# Patient Record
Sex: MALE | Race: BLACK OR AFRICAN AMERICAN | Employment: FULL TIME | RURAL
[De-identification: names, ages, dates, MRNs, and addresses within clinical notes are randomized per-mention and may not be internally consistent; named-entity substitution may affect disease eponyms.]

---

## 2017-05-12 ENCOUNTER — OFFICE VISIT (OUTPATIENT)
Dept: FAMILY MEDICINE CLINIC | Age: 47
End: 2017-05-12

## 2017-05-12 VITALS
HEART RATE: 70 BPM | BODY MASS INDEX: 40.94 KG/M2 | TEMPERATURE: 98.3 F | SYSTOLIC BLOOD PRESSURE: 123 MMHG | RESPIRATION RATE: 20 BRPM | OXYGEN SATURATION: 95 % | WEIGHT: 286 LBS | DIASTOLIC BLOOD PRESSURE: 75 MMHG | HEIGHT: 70 IN

## 2017-05-12 DIAGNOSIS — Z12.5 PROSTATE CANCER SCREENING: ICD-10-CM

## 2017-05-12 DIAGNOSIS — K21.9 GASTROESOPHAGEAL REFLUX DISEASE WITHOUT ESOPHAGITIS: ICD-10-CM

## 2017-05-12 DIAGNOSIS — I10 ESSENTIAL HYPERTENSION WITH GOAL BLOOD PRESSURE LESS THAN 140/90: Primary | ICD-10-CM

## 2017-05-12 DIAGNOSIS — N52.9 ERECTILE DYSFUNCTION, UNSPECIFIED ERECTILE DYSFUNCTION TYPE: ICD-10-CM

## 2017-05-12 DIAGNOSIS — Z88.9 H/O SEASONAL ALLERGIES: ICD-10-CM

## 2017-05-12 DIAGNOSIS — E78.2 MIXED HYPERLIPIDEMIA: ICD-10-CM

## 2017-05-12 RX ORDER — OMEPRAZOLE 40 MG/1
40 CAPSULE, DELAYED RELEASE ORAL DAILY
Qty: 90 CAP | Refills: 3 | Status: SHIPPED | OUTPATIENT
Start: 2017-05-12 | End: 2018-05-18 | Stop reason: SDUPTHER

## 2017-05-12 RX ORDER — LISINOPRIL AND HYDROCHLOROTHIAZIDE 20; 25 MG/1; MG/1
1 TABLET ORAL DAILY
Qty: 90 TAB | Refills: 3 | Status: SHIPPED | OUTPATIENT
Start: 2017-05-12 | End: 2018-05-18 | Stop reason: SDUPTHER

## 2017-05-12 RX ORDER — TADALAFIL 5 MG/1
5 TABLET ORAL DAILY
Qty: 90 TAB | Refills: 3 | Status: SHIPPED | OUTPATIENT
Start: 2017-05-12 | End: 2018-05-18 | Stop reason: SDUPTHER

## 2017-05-12 RX ORDER — DESLORATADINE 5 MG/1
5 TABLET ORAL DAILY
Qty: 90 TAB | Refills: 3 | Status: SHIPPED | OUTPATIENT
Start: 2017-05-12 | End: 2018-05-18 | Stop reason: SDUPTHER

## 2017-05-12 RX ORDER — EZETIMIBE AND SIMVASTATIN 10; 20 MG/1; MG/1
1 TABLET ORAL DAILY
Qty: 90 TAB | Refills: 3 | Status: SHIPPED | OUTPATIENT
Start: 2017-05-12 | End: 2018-05-18 | Stop reason: SDUPTHER

## 2017-05-12 NOTE — MR AVS SNAPSHOT
Visit Information Date & Time Provider Department Dept. Phone Encounter #  
 5/12/2017  2:00 PM Gemini Nath MD Alexandro Daniels [id number] Follow-up Instructions Return in about 1 month (around 6/12/2017) for Diet discussion, lab review. Upcoming Health Maintenance Date Due DTaP/Tdap/Td series (2 - Td) 5/20/2025 Allergies as of 5/12/2017  Review Complete On: 5/12/2017 By: Gemini Nath MD  
 No Known Allergies Current Immunizations  Never Reviewed Name Date Influenza Vaccine 12/12/2012 Influenza Vaccine (Quad) PF 9/22/2016 Tdap 5/20/2015 Not reviewed this visit You Were Diagnosed With   
  
 Codes Comments Essential hypertension with goal blood pressure less than 140/90    -  Primary ICD-10-CM: I10 
ICD-9-CM: 401.9 H/O seasonal allergies     ICD-10-CM: Z88.9 ICD-9-CM: V15.09 Erectile dysfunction, unspecified erectile dysfunction type     ICD-10-CM: N52.9 ICD-9-CM: 607.84 Gastroesophageal reflux disease without esophagitis     ICD-10-CM: K21.9 ICD-9-CM: 530.81 Mixed hyperlipidemia     ICD-10-CM: E78.2 ICD-9-CM: 272.2 Prostate cancer screening     ICD-10-CM: Z12.5 ICD-9-CM: V76.44 Vitals BP Pulse Temp Resp Height(growth percentile) Weight(growth percentile) 123/75 (BP 1 Location: Left arm, BP Patient Position: Sitting) 70 98.3 °F (36.8 °C) (Oral) 20 5' 10\" (1.778 m) 286 lb (129.7 kg) SpO2 BMI Smoking Status 95% 41.04 kg/m2 Never Smoker Vitals History BMI and BSA Data Body Mass Index Body Surface Area 41.04 kg/m 2 2.53 m 2 Preferred Pharmacy Pharmacy Name Phone [facility] [phone] Your Updated Medication List  
  
   
This list is accurate as of: 5/12/17  2:25 PM.  Always use your most recent med list.  
  
  
  
  
 clobetasol 0.05 % topical foam  
Commonly known as:  OLUX Apply  to affected area two (2) times a day. use thin film on affected area  
  
 cyclobenzaprine 10 mg tablet Commonly known as:  FLEXERIL Take 1 Tab by mouth three (3) times daily as needed for Muscle Spasm(s). desloratadine 5 mg tablet Commonly known as:  CLARINEX Take 1 Tab by mouth daily. diphenhydrAMINE 25 mg capsule Commonly known as:  BENADRYL Take 1 Cap by mouth every six (6) hours as needed. ezetimibe-simvastatin 10-20 mg per tablet Commonly known as:  Vytorin 10/20 Take 1 Tab by mouth daily. ibuprofen 800 mg tablet Commonly known as:  MOTRIN Take 1 tablet by mouth every eight (8) hours as needed for Pain. lisinopril-hydroCHLOROthiazide 20-25 mg per tablet Commonly known as:  Jerald Mari Take 1 Tab by mouth daily. omeprazole 40 mg capsule Commonly known as:  PRILOSEC Take 1 Cap by mouth daily. tadalafil 5 mg tablet Commonly known as:  CIALIS Take 1 Tab by mouth daily. Prescriptions Sent to Pharmacy Refills  
 desloratadine (CLARINEX) 5 mg tablet 3 Sig: Take 1 Tab by mouth daily. Class: Normal  
 Pharmacy: [address], [address] Ph #: [phone] Route: Oral  
 tadalafil (CIALIS) 5 mg tablet 3 Sig: Take 1 Tab by mouth daily. Class: Normal  
 Pharmacy: [address], [address] Ph #: [phone] Route: Oral  
 lisinopril-hydroCHLOROthiazide (PRINZIDE, ZESTORETIC) 20-25 mg per tablet 3 Sig: Take 1 Tab by mouth daily. Class: Normal  
 Pharmacy: [address], [address] Ph #: [phone] Route: Oral  
 omeprazole (PRILOSEC) 40 mg capsule 3 Sig: Take 1 Cap by mouth daily.   
 Class: Normal  
 Pharmacy: [address], [address] [facility] Ph #: [phone] Route: Oral  
 ezetimibe-simvastatin (VYTORIN 10/20) 10-20 mg per tablet 3 Sig: Take 1 Tab by mouth daily. Class: Normal  
 Pharmacy: [address], [address] [place] [address] Ph #: [phone] Route: Oral  
  
We Performed the Following CBC W/O DIFF [51608 CPT(R)] LIPID PANEL [43207 CPT(R)] METABOLIC PANEL, COMPREHENSIVE [21199 CPT(R)] PSA SCREENING (SCREENING) [ [facility]] Follow-up Instructions Return in about 1 month (around 6/12/2017) for Diet discussion, lab review. Patient Instructions Learning About High Blood Pressure What is high blood pressure? Blood pressure is a measure of how hard the blood pushes against the walls of your arteries. It's normal for blood pressure to go up and down throughout the day, but if it stays up, you have high blood pressure. Another name for high blood pressure is hypertension. Two numbers tell you your blood pressure. The first number is the systolic pressure. It shows how hard the blood pushes when your heart is pumping. The second number is the diastolic pressure. It shows how hard the blood pushes between heartbeats, when your heart is relaxed and filling with blood. A blood pressure of less than 120/80 (say \"120 over 80\") is ideal for an adult. High blood pressure is 140/90 or higher. You have high blood pressure if your top number is 140 or higher or your bottom number is 90 or higher, or both. Many people fall into the category in between, called prehypertension. People with prehypertension need to make lifestyle changes to bring their blood pressure down and help prevent or delay high blood pressure. What happens when you have high blood pressure? · Blood flows through your arteries with too much force. Over time, this damages the walls of your arteries. But you can't feel it. High blood pressure usually doesn't cause symptoms. · Fat and calcium start to build up in your arteries. This buildup is called plaque. Plaque makes your arteries narrower and stiffer. Blood can't flow through them as easily. · This lack of good blood flow starts to damage some of the organs in your body. This can lead to problems such as coronary artery disease and heart attack, heart failure, stroke, kidney failure, and eye damage. How can you prevent high blood pressure? · Stay at a healthy weight. · Try to limit how much sodium you eat to less than 2,300 milligrams (mg) a day. If you limit your sodium to 1,500 mg a day, you can lower your blood pressure even more. ¨ Buy foods that are labeled \"unsalted,\" \"sodium-free,\" or \"low-sodium. \" Foods labeled \"reduced-sodium\" and \"light sodium\" may still have too much sodium. ¨ Flavor your food with garlic, lemon juice, onion, vinegar, herbs, and spices instead of salt. Do not use soy sauce, steak sauce, onion salt, garlic salt, mustard, or ketchup on your food. ¨ Use less salt (or none) when recipes call for it. You can often use half the salt a recipe calls for without losing flavor. · Be physically active. Get at least 30 minutes of exercise on most days of the week. Walking is a good choice. You also may want to do other activities, such as running, swimming, cycling, or playing tennis or team sports. · Limit alcohol to 2 drinks a day for men and 1 drink a day for women. · Eat plenty of fruits, vegetables, and low-fat dairy products. Eat less saturated and total fats. How is high blood pressure treated? · Your doctor will suggest making lifestyle changes. For example, your doctor may ask you to eat healthy foods, quit smoking, lose extra weight, and be more active. · If lifestyle changes don't help enough or your blood pressure is very high, you will have to take medicine every day. Follow-up care is a key part of your treatment and safety.  Be sure to make and go to all appointments, and call your doctor if you are having problems. It's also a good idea to know your test results and keep a list of the medicines you take. Where can you learn more? Go to http://tea-ana.info/. Enter P501 in the search box to learn more about \"Learning About High Blood Pressure. \" Current as of: March 23, 2016 Content Version: 11.2 © 8855-0472 [employer]. Care instructions adapted under license by [facility] (which disclaims liability or warranty for this information). If you have questions about a medical condition or this instruction, always ask your healthcare professional. [address] any warranty or liability for your use of this information. DASH Diet: Care Instructions Your Care Instructions The DASH diet is an eating plan that can help lower your blood pressure. DASH stands for Dietary Approaches to Stop Hypertension. Hypertension is high blood pressure. The DASH diet focuses on eating foods that are high in calcium, potassium, and magnesium. These nutrients can lower blood pressure. The foods that are highest in these nutrients are fruits, vegetables, low-fat dairy products, nuts, seeds, and legumes. But taking calcium, potassium, and magnesium supplements instead of eating foods that are high in those nutrients does not have the same effect. The DASH diet also includes whole grains, fish, and poultry. The DASH diet is one of several lifestyle changes your doctor may recommend to lower your high blood pressure. Your doctor may also want you to decrease the amount of sodium in your diet. Lowering sodium while following the DASH diet can lower blood pressure even further than just the DASH diet alone. Follow-up care is a key part of your treatment and safety.  Be sure to make and go to all appointments, and call your doctor if you are having problems. It's also a good idea to know your test results and keep a list of the medicines you take. How can you care for yourself at home? Following the DASH diet · Eat 4 to 5 servings of fruit each day. A serving is 1 medium-sized piece of fruit, ½ cup chopped or canned fruit, 1/4 cup dried fruit, or 4 ounces (½ cup) of fruit juice. Choose fruit more often than fruit juice. · Eat 4 to 5 servings of vegetables each day. A serving is 1 cup of lettuce or raw leafy vegetables, ½ cup of chopped or cooked vegetables, or 4 ounces (½ cup) of vegetable juice. Choose vegetables more often than vegetable juice. · Get 2 to 3 servings of low-fat and fat-free dairy each day. A serving is 8 ounces of milk, 1 cup of yogurt, or 1 ½ ounces of cheese. · Eat 6 to 8 servings of grains each day. A serving is 1 slice of bread, 1 ounce of dry cereal, or ½ cup of cooked rice, pasta, or cooked cereal. Try to choose whole-grain products as much as possible. · Limit lean meat, poultry, and fish to 2 servings each day. A serving is 3 ounces, about the size of a deck of cards. · Eat 4 to 5 servings of nuts, seeds, and legumes (cooked dried beans, lentils, and split peas) each week. A serving is 1/3 cup of nuts, 2 tablespoons of seeds, or ½ cup of cooked beans or peas. · Limit fats and oils to 2 to 3 servings each day. A serving is 1 teaspoon of vegetable oil or 2 tablespoons of salad dressing. · Limit sweets and added sugars to 5 servings or less a week. A serving is 1 tablespoon jelly or jam, ½ cup sorbet, or 1 cup of lemonade. · Eat less than 2,300 milligrams (mg) of sodium a day. If you limit your sodium to 1,500 mg a day, you can lower your blood pressure even more. Tips for success · Start small. Do not try to make dramatic changes to your diet all at once. You might feel that you are missing out on your favorite foods and then be more likely to not follow the plan.  Make small changes, and stick with them. Once those changes become habit, add a few more changes. · Try some of the following: ¨ Make it a goal to eat a fruit or vegetable at every meal and at snacks. This will make it easy to get the recommended amount of fruits and vegetables each day. ¨ Try yogurt topped with fruit and nuts for a snack or healthy dessert. ¨ Add lettuce, tomato, cucumber, and onion to sandwiches. ¨ Combine a ready-made pizza crust with low-fat mozzarella cheese and lots of vegetable toppings. Try using tomatoes, squash, spinach, broccoli, carrots, cauliflower, and onions. ¨ Have a variety of cut-up vegetables with a low-fat dip as an appetizer instead of chips and dip. ¨ Sprinkle sunflower seeds or chopped almonds over salads. Or try adding chopped walnuts or almonds to cooked vegetables. ¨ Try some vegetarian meals using beans and peas. Add garbanzo or kidney beans to salads. Make burritos and tacos with mashed kumar beans or black beans. Where can you learn more? Go to http://tea-ana.info/. Enter A910 in the search box to learn more about \"DASH Diet: Care Instructions. \" Current as of: March 23, 2016 Content Version: 11.2 © 6960-2869 [employer]card. Care instructions adapted under license by [employer] (which disclaims liability or warranty for this information). If you have questions about a medical condition or this instruction, always ask your healthcare professional. [place] [address] any warranty or liability for your use of this information. Introducing [facility] & HEALTH SERVICES! [facility] introduces [employer] patient portal. Now you can access parts of your medical record, email your doctor's office, and request medication refills online. 1. In your internet browser, go to [email or web address] 2. Click on the First Time User? Click Here link in the Sign In box. You will see the New Member Sign Up page. 3. Enter your [facility] Access Code exactly as it appears below. You will not need to use this code after youve completed the sign-up process. If you do not sign up before the expiration date, you must request a new code. · [employer] Access Code: [email or web address] Expires: 8/10/2017  2:25 PM 
 
4. Enter the last four digits of your Social Security Number (xxxx) and Date of Birth (mm/dd/yyyy) as indicated and click Submit. You will be taken to the next sign-up page. 5. Create a [facility]med ID. This will be your [facility] login ID and cannot be changed, so think of one that is secure and easy to remember. 6. Create a [employer] password. You can change your password at any time. 7. Enter your Password Reset Question and Answer. This can be used at a later time if you forget your password. 8. Enter your e-mail address. You will receive e-mail notification when new information is available in [address]. 9. Click Sign Up. You can now view and download portions of your medical record. 10. Click the Download Summary menu link to download a portable copy of your medical information. If you have questions, please visit the Frequently Asked Questions section of the [employer] website. Remember, [facility] is NOT to be used for urgent needs. For medical emergencies, dial 911. Now available from your iPhone and Android! Please provide this summary of care documentation to your next provider. Your primary care clinician is listed as [address]. If you have any questions after today's visit, please call [phone].

## 2017-05-12 NOTE — PROGRESS NOTES
[facility]    Subjective:   Selam Baldwin is a 49 y.o. male with history of HTN, HLD, ED, and environmental Allergies  CC: Follow up, [place] paperwork  History provided by patient and Records    HPI:    Hypertension: The patient reports:  taking medications as instructed, no medication side effects noted, home BP monitoring in range of 054-291'D systolic over 83-21'G diastolic, no TIA's, no chest pain on exertion, no dyspnea on exertion, no swelling of ankles, no orthostatic dizziness or lightheadedness, no orthopnea or paroxysmal nocturnal dyspnea, no palpitations, erectile dysfunction is present, but improved currently with medication. Hypertriglyceridemia: Cardiovascular risks for him are: hypertension, hyperlipidemia, obese. Currently he takes Vytorin (ezetimibe-simvastatin) , 10-20 mg. Denies myalgia, fatigue, or other side effects with medication. GERD: Current control of Symptoms is good with omeprazole. The patient uses Motrin occasionally for muscle aches and pains. The patient has no history melena or bright red blood in the stools. No history of hiatal hernia. Erectile Dysfunction: Currently takes Cialis and takes 1 tablet every other day. With current regimen states he is able to maintain erection and perform without significant issue. Denies painful or prolonged erections. Current Outpatient Prescriptions on File Prior to Visit   Medication Sig Dispense Refill    lisinopril-hydrochlorothiazide (PRINZIDE, ZESTORETIC) 20-25 mg per tablet Take 1 Tab by mouth daily. 90 Tab 3    omeprazole (PRILOSEC) 40 mg capsule Take 1 Cap by mouth daily. 90 Cap 3    ezetimibe-simvastatin (VYTORIN 10/20) 10-20 mg per tablet Take 1 Tab by mouth daily. 90 Tab 3    desloratadine (CLARINEX) 5 mg tablet Take 1 Tab by mouth daily. 90 Tab 3    tadalafil (CIALIS) 5 mg tablet Take 1 Tab by mouth daily.  90 Tab 3    cyclobenzaprine (FLEXERIL) 10 mg tablet Take 1 Tab by mouth three (3) times daily as needed for Muscle Spasm(s). 60 Tab 2    ibuprofen (MOTRIN) 800 mg tablet Take 1 tablet by mouth every eight (8) hours as needed for Pain. 90 tablet 3    diphenhydrAMINE (BENADRYL) 25 mg capsule Take 1 Cap by mouth every six (6) hours as needed. 30 Cap 2    clobetasol (OLUX) 0.05 % topical foam Apply  to affected area two (2) times a day. use thin film on affected area 1 Can 0     No current facility-administered medications on file prior to visit. Patient Active Problem List   Diagnosis Code    Herniated disc [id number]    Environmental allergies Z91.09    HTN (hypertension) I10    Hyperlipidemia E78.5    ED (erectile dysfunction) of organic origin N52.9    GERD (gastroesophageal reflux disease) K21.9       Social History     Social History    Marital status:      Spouse name: N/A    Number of children: N/A    Years of education: N/A     Occupational History    Not on file. Social History Main Topics    Smoking status: Never Smoker    Smokeless tobacco: Never Used    Alcohol use Yes      Comment: social    Drug use: No    Sexual activity: Not on file     Other Topics Concern    Not on file     Social History Narrative       Review of Systems   HENT: Positive for congestion. Eyes: Negative for blurred vision and photophobia. Respiratory: Negative for cough and shortness of breath. Cardiovascular: Negative for chest pain and palpitations. Gastrointestinal: Negative for heartburn, nausea and vomiting. Neurological: Negative for dizziness and headaches.          Objective:     Visit Vitals    /75 (BP 1 Location: Left arm, BP Patient Position: Sitting)    Pulse 70    Temp 98.3 °F (36.8 °C) (Oral)    Resp 20    Ht 5' 10\" (1.778 m)    Wt 286 lb (129.7 kg)    SpO2 95%    BMI 41.04 kg/m2      BP Readings from Last 3 Encounters:   05/12/17 123/75   09/22/16 120/73   04/21/16 117/85     Wt Readings from Last 3 Encounters:   05/12/17 286 lb (129.7 kg)   09/22/16 283 lb (128.4 kg)   04/21/16 281 lb 3.2 oz (127.6 kg)         Physical Exam   Constitutional: He appears well-developed and well-nourished. No distress. HENT:   Head: Normocephalic and atraumatic. Eyes: Pupils are equal, round, and reactive to light. Fundoscopic exam:       The right eye shows no hemorrhage and no papilledema. The left eye shows no hemorrhage and no papilledema. Cardiovascular: Normal rate, regular rhythm, normal heart sounds and intact distal pulses. Pulmonary/Chest: Effort normal and breath sounds normal.   Abdominal: Soft. Bowel sounds are normal. There is no tenderness. Musculoskeletal: He exhibits no edema. Nursing note and vitals reviewed. Pertinent Labs/Studies:    Lab Results   Component Value Date/Time    Sodium 133 04/21/2016 08:56 AM    Potassium 4.4 04/21/2016 08:56 AM    Chloride 93 04/21/2016 08:56 AM    CO2 22 04/21/2016 08:56 AM    Anion gap 8 09/22/2010 09:09 AM    Glucose 104 04/21/2016 08:56 AM    BUN 13 04/21/2016 08:56 AM    Creatinine 0.92 04/21/2016 08:56 AM    BUN/Creatinine ratio 14 04/21/2016 08:56 AM    GFR est  04/21/2016 08:56 AM    GFR est non-AA 99 04/21/2016 08:56 AM    Calcium 10.6 04/21/2016 08:56 AM    Bilirubin, total 0.9 04/21/2016 08:56 AM    AST (SGOT) 22 04/21/2016 08:56 AM    Alk. phosphatase 42 04/21/2016 08:56 AM    Protein, total 7.6 04/21/2016 08:56 AM    Albumin 4.5 04/21/2016 08:56 AM    Globulin 3.9 09/22/2010 09:09 AM    A-G Ratio 1.5 04/21/2016 08:56 AM    ALT (SGPT) 23 04/21/2016 08:56 AM       Lab Results   Component Value Date/Time    Cholesterol, total 133 04/21/2016 08:56 AM    HDL Cholesterol 35 04/21/2016 08:56 AM    LDL, calculated 63 04/21/2016 08:56 AM    Triglyceride 177 04/21/2016 08:56 AM    CHOL/HDL Ratio 5.0 09/22/2010 09:09 AM     Lab Results   Component Value Date/Time    ALT (SGPT) 23 04/21/2016 08:56 AM    AST (SGOT) 22 04/21/2016 08:56 AM    Alk.  phosphatase 42 04/21/2016 08:56 AM    Bilirubin, total 0.9 04/21/2016 08:56 AM         Assessment and orders:       ICD-10-CM ICD-9-CM    1. Essential hypertension with goal blood pressure less than 140/90 I10 401.9 lisinopril-hydroCHLOROthiazide (PRINZIDE, ZESTORETIC) 20-25 mg per tablet      CBC W/O DIFF      METABOLIC PANEL, COMPREHENSIVE   2. H/O seasonal allergies Z88.9 V15.09 desloratadine (CLARINEX) 5 mg tablet   3. Erectile dysfunction, unspecified erectile dysfunction type N52.9 607.84 tadalafil (CIALIS) 5 mg tablet   4. Gastroesophageal reflux disease without esophagitis K21.9 530.81 omeprazole (PRILOSEC) 40 mg capsule   5. Mixed hyperlipidemia E78.2 272.2 ezetimibe-simvastatin (VYTORIN 10/20) 10-20 mg per tablet      LIPID PANEL   6. Prostate cancer screening Z12.5 V76.44 PSA SCREENING (SCREENING)     Little Prom was seen today for hypertension and cholesterol problem. Diagnoses and all orders for this visit:    Essential hypertension with goal blood pressure less than 140/90: Appears very well controlled in office and at home. Discussed goal blood pressures with goal to prevent stroke and heart disease. Filled out [facility] paperwork for follow ups for HTN management. On follow up visit will discuss body weight.  -     lisinopril-hydroCHLOROthiazide (PRINZIDE, ZESTORETIC) 20-25 mg per tablet; Take 1 Tab by mouth daily.  -     CBC W/O DIFF  -     METABOLIC PANEL, COMPREHENSIVE    H/O seasonal allergies: Mildly exacerbated, refill medication. -     desloratadine (CLARINEX) 5 mg tablet; Take 1 Tab by mouth daily. Erectile dysfunction, unspecified erectile dysfunction type: Stable at this time, recommend continued use as described. Discussed alternative methods of use, but patient is satisfied with current regimen. Discussed use of alternative BP medication (Diovan) and decreased effect on erection, on review will advise continued current regimen as unlikely to have significant improvemen with change in medication.   -     tadalafil (CIALIS) 5 mg tablet; Take 1 Tab by mouth daily. Gastroesophageal reflux disease without esophagitis: Stable, refill. Discussed risks and benefits of use of PPI's and slow reduction of use as tolerated. -     omeprazole (PRILOSEC) 40 mg capsule; Take 1 Cap by mouth daily. Mixed hyperlipidemia: The patient is aware of our goal to reduce or eliminate the long term problems (such as strokes and heart attacks) related to poorly controlled Triglycerides. -     ezetimibe-simvastatin (VYTORIN 10/20) 10-20 mg per tablet; Take 1 Tab by mouth daily.  -     LIPID PANEL    Prostate cancer screening: Family history of Prostate cancer, Last PSA 1.3 in 2015. If normal repeat in 1-2 years. -     PSA - SCREENING ()      Follow-up Disposition:  Return in about 1 month (around 6/12/2017) for Diet discussion, lab review. I have reviewed patient medical and social history and medications. I have reviewed pertinent labs results and other data. I have discussed the diagnosis with the patient and the intended plan as seen in the above orders. The patient has received an after-visit summary and questions were answered concerning future plans. I have discussed medication side effects and warnings with the patient as well.     Constantine Vasquez MD  Resident [facility] ALEXANDRA [facility]  05/12/17    Patient discussed with Dr. Desire Ervin, Attending Physician

## 2017-05-12 NOTE — PATIENT INSTRUCTIONS
Learning About High Blood Pressure  What is high blood pressure? Blood pressure is a measure of how hard the blood pushes against the walls of your arteries. It's normal for blood pressure to go up and down throughout the day, but if it stays up, you have high blood pressure. Another name for high blood pressure is hypertension. Two numbers tell you your blood pressure. The first number is the systolic pressure. It shows how hard the blood pushes when your heart is pumping. The second number is the diastolic pressure. It shows how hard the blood pushes between heartbeats, when your heart is relaxed and filling with blood. A blood pressure of less than 120/80 (say \"120 over 80\") is ideal for an adult. High blood pressure is 140/90 or higher. You have high blood pressure if your top number is 140 or higher or your bottom number is 90 or higher, or both. Many people fall into the category in between, called prehypertension. People with prehypertension need to make lifestyle changes to bring their blood pressure down and help prevent or delay high blood pressure. What happens when you have high blood pressure? · Blood flows through your arteries with too much force. Over time, this damages the walls of your arteries. But you can't feel it. High blood pressure usually doesn't cause symptoms. · Fat and calcium start to build up in your arteries. This buildup is called plaque. Plaque makes your arteries narrower and stiffer. Blood can't flow through them as easily. · This lack of good blood flow starts to damage some of the organs in your body. This can lead to problems such as coronary artery disease and heart attack, heart failure, stroke, kidney failure, and eye damage. How can you prevent high blood pressure? · Stay at a healthy weight. · Try to limit how much sodium you eat to less than 2,300 milligrams (mg) a day.  If you limit your sodium to 1,500 mg a day, you can lower your blood pressure even more.  ¨ Buy foods that are labeled \"unsalted,\" \"sodium-free,\" or \"low-sodium. \" Foods labeled \"reduced-sodium\" and \"light sodium\" may still have too much sodium. ¨ Flavor your food with garlic, lemon juice, onion, vinegar, herbs, and spices instead of salt. Do not use soy sauce, steak sauce, onion salt, garlic salt, mustard, or ketchup on your food. ¨ Use less salt (or none) when recipes call for it. You can often use half the salt a recipe calls for without losing flavor. · Be physically active. Get at least 30 minutes of exercise on most days of the week. Walking is a good choice. You also may want to do other activities, such as running, swimming, cycling, or playing tennis or team sports. · Limit alcohol to 2 drinks a day for men and 1 drink a day for women. · Eat plenty of fruits, vegetables, and low-fat dairy products. Eat less saturated and total fats. How is high blood pressure treated? · Your doctor will suggest making lifestyle changes. For example, your doctor may ask you to eat healthy foods, quit smoking, lose extra weight, and be more active. · If lifestyle changes don't help enough or your blood pressure is very high, you will have to take medicine every day. Follow-up care is a key part of your treatment and safety. Be sure to make and go to all appointments, and call your doctor if you are having problems. It's also a good idea to know your test results and keep a list of the medicines you take. Where can you learn more? Go to http://tea-ana.info/. Enter P501 in the search box to learn more about \"Learning About High Blood Pressure. \"  Current as of: March 23, 2016  Content Version: 11.2  © 0624-2176 [employer]. Care instructions adapted under license by [employer] (which disclaims liability or warranty for this information).  If you have questions about a medical condition or this instruction, always ask your healthcare professional. [employer], [facility] disclaims any warranty or liability for your use of this information. DASH Diet: Care Instructions  Your Care Instructions  The DASH diet is an eating plan that can help lower your blood pressure. DASH stands for Dietary Approaches to Stop Hypertension. Hypertension is high blood pressure. The DASH diet focuses on eating foods that are high in calcium, potassium, and magnesium. These nutrients can lower blood pressure. The foods that are highest in these nutrients are fruits, vegetables, low-fat dairy products, nuts, seeds, and legumes. But taking calcium, potassium, and magnesium supplements instead of eating foods that are high in those nutrients does not have the same effect. The DASH diet also includes whole grains, fish, and poultry. The DASH diet is one of several lifestyle changes your doctor may recommend to lower your high blood pressure. Your doctor may also want you to decrease the amount of sodium in your diet. Lowering sodium while following the DASH diet can lower blood pressure even further than just the DASH diet alone. Follow-up care is a key part of your treatment and safety. Be sure to make and go to all appointments, and call your doctor if you are having problems. It's also a good idea to know your test results and keep a list of the medicines you take. How can you care for yourself at home? Following the DASH diet  · Eat 4 to 5 servings of fruit each day. A serving is 1 medium-sized piece of fruit, ½ cup chopped or canned fruit, 1/4 cup dried fruit, or 4 ounces (½ cup) of fruit juice. Choose fruit more often than fruit juice. · Eat 4 to 5 servings of vegetables each day. A serving is 1 cup of lettuce or raw leafy vegetables, ½ cup of chopped or cooked vegetables, or 4 ounces (½ cup) of vegetable juice. Choose vegetables more often than vegetable juice. · Get 2 to 3 servings of low-fat and fat-free dairy each day.  A serving is 8 ounces of milk, 1 cup of yogurt, or 1 ½ ounces of cheese. · Eat 6 to 8 servings of grains each day. A serving is 1 slice of bread, 1 ounce of dry cereal, or ½ cup of cooked rice, pasta, or cooked cereal. Try to choose whole-grain products as much as possible. · Limit lean meat, poultry, and fish to 2 servings each day. A serving is 3 ounces, about the size of a deck of cards. · Eat 4 to 5 servings of nuts, seeds, and legumes (cooked dried beans, lentils, and split peas) each week. A serving is 1/3 cup of nuts, 2 tablespoons of seeds, or ½ cup of cooked beans or peas. · Limit fats and oils to 2 to 3 servings each day. A serving is 1 teaspoon of vegetable oil or 2 tablespoons of salad dressing. · Limit sweets and added sugars to 5 servings or less a week. A serving is 1 tablespoon jelly or jam, ½ cup sorbet, or 1 cup of lemonade. · Eat less than 2,300 milligrams (mg) of sodium a day. If you limit your sodium to 1,500 mg a day, you can lower your blood pressure even more. Tips for success  · Start small. Do not try to make dramatic changes to your diet all at once. You might feel that you are missing out on your favorite foods and then be more likely to not follow the plan. Make small changes, and stick with them. Once those changes become habit, add a few more changes. · Try some of the following:  ¨ Make it a goal to eat a fruit or vegetable at every meal and at snacks. This will make it easy to get the recommended amount of fruits and vegetables each day. ¨ Try yogurt topped with fruit and nuts for a snack or healthy dessert. ¨ Add lettuce, tomato, cucumber, and onion to sandwiches. ¨ Combine a ready-made pizza crust with low-fat mozzarella cheese and lots of vegetable toppings. Try using tomatoes, squash, spinach, broccoli, carrots, cauliflower, and onions. ¨ Have a variety of cut-up vegetables with a low-fat dip as an appetizer instead of chips and dip. ¨ Sprinkle sunflower seeds or chopped almonds over salads.  Or try adding chopped walnuts or almonds to cooked vegetables. ¨ Try some vegetarian meals using beans and peas. Add garbanzo or kidney beans to salads. Make burritos and tacos with mashed kumar beans or black beans. Where can you learn more? Go to http://tea-ana.info/. Enter Z939 in the search box to learn more about \"DASH Diet: Care Instructions. \"  Current as of: March 23, 2016  Content Version: 11.2  © 8825-7819 [employer]. Care instructions adapted under license by [employer] (which disclaims liability or warranty for this information). If you have questions about a medical condition or this instruction, always ask your healthcare professional. [address] any warranty or liability for your use of this information.

## 2017-05-14 NOTE — PROGRESS NOTES
I discussed the findings, assessment and plan in detail with the resident and agree with the resident's findings and plan as documented in the resident's note.     Yong Chappell MD

## 2017-05-20 LAB
ALBUMIN SERPL-MCNC: 4.3 G/DL (ref 3.5–5.5)
ALBUMIN/GLOB SERPL: 1.5 {RATIO} (ref 1.2–2.2)
ALP SERPL-CCNC: 39 IU/L (ref 39–117)
ALT SERPL-CCNC: 23 IU/L (ref 0–44)
AST SERPL-CCNC: 20 IU/L (ref 0–40)
BILIRUB SERPL-MCNC: 0.7 MG/DL (ref 0–1.2)
BUN SERPL-MCNC: 14 MG/DL (ref 6–24)
BUN/CREAT SERPL: 13 (ref 9–20)
CALCIUM SERPL-MCNC: 10.1 MG/DL (ref 8.7–10.2)
CHLORIDE SERPL-SCNC: 94 MMOL/L (ref 96–106)
CHOLEST SERPL-MCNC: 111 MG/DL (ref 100–199)
CO2 SERPL-SCNC: 24 MMOL/L (ref 18–29)
CREAT SERPL-MCNC: 1.09 MG/DL (ref 0.76–1.27)
ERYTHROCYTE [DISTWIDTH] IN BLOOD BY AUTOMATED COUNT: 13.8 % (ref 12.3–15.4)
GLOBULIN SER CALC-MCNC: 2.8 G/DL (ref 1.5–4.5)
GLUCOSE SERPL-MCNC: 107 MG/DL (ref 65–99)
HCT VFR BLD AUTO: 41.7 % (ref 37.5–51)
HDLC SERPL-MCNC: 31 MG/DL
HGB BLD-MCNC: 13.9 G/DL (ref 12.6–17.7)
LDLC SERPL CALC-MCNC: 60 MG/DL (ref 0–99)
MCH RBC QN AUTO: 29 PG (ref 26.6–33)
MCHC RBC AUTO-ENTMCNC: 33.3 G/DL (ref 31.5–35.7)
MCV RBC AUTO: 87 FL (ref 79–97)
PLATELET # BLD AUTO: 268 X10E3/UL (ref 150–379)
POTASSIUM SERPL-SCNC: 4.5 MMOL/L (ref 3.5–5.2)
PROT SERPL-MCNC: 7.1 G/DL (ref 6–8.5)
PSA SERPL-MCNC: 1.2 NG/ML (ref 0–4)
RBC # BLD AUTO: 4.8 X10E6/UL (ref 4.14–5.8)
SODIUM SERPL-SCNC: 134 MMOL/L (ref 134–144)
TRIGL SERPL-MCNC: 98 MG/DL (ref 0–149)
VLDLC SERPL CALC-MCNC: 20 MG/DL (ref 5–40)
WBC # BLD AUTO: 4.9 X10E3/UL (ref 3.4–10.8)

## 2017-05-30 NOTE — PROGRESS NOTES
Stable and well controlled labs overall. HDL is on the low side, but otherwise Lipids are looking good. PSA stable and wnl.

## 2017-07-01 NOTE — ACP (ADVANCE CARE PLANNING)
Information was given to pt on Advanced Directives, Living Will  opportunity was given for questions "None."

## 2018-05-18 ENCOUNTER — OFFICE VISIT (OUTPATIENT)
Dept: FAMILY MEDICINE CLINIC | Age: 48
End: 2018-05-18

## 2018-05-18 VITALS
HEIGHT: 70 IN | SYSTOLIC BLOOD PRESSURE: 118 MMHG | OXYGEN SATURATION: 97 % | BODY MASS INDEX: 40.86 KG/M2 | RESPIRATION RATE: 20 BRPM | TEMPERATURE: 100 F | DIASTOLIC BLOOD PRESSURE: 77 MMHG | WEIGHT: 285.4 LBS | HEART RATE: 68 BPM

## 2018-05-18 DIAGNOSIS — Z87.39 HISTORY OF HERNIATED INTERVERTEBRAL DISC: ICD-10-CM

## 2018-05-18 DIAGNOSIS — E78.2 MIXED HYPERLIPIDEMIA: ICD-10-CM

## 2018-05-18 DIAGNOSIS — R39.198 URINARY STREAM SLOWING: ICD-10-CM

## 2018-05-18 DIAGNOSIS — K21.9 GASTROESOPHAGEAL REFLUX DISEASE WITHOUT ESOPHAGITIS: ICD-10-CM

## 2018-05-18 DIAGNOSIS — I10 ESSENTIAL HYPERTENSION WITH GOAL BLOOD PRESSURE LESS THAN 140/90: Primary | ICD-10-CM

## 2018-05-18 DIAGNOSIS — N52.9 ERECTILE DYSFUNCTION, UNSPECIFIED ERECTILE DYSFUNCTION TYPE: ICD-10-CM

## 2018-05-18 DIAGNOSIS — H10.13 ALLERGIC CONJUNCTIVITIS OF BOTH EYES: ICD-10-CM

## 2018-05-18 DIAGNOSIS — Z88.9 H/O SEASONAL ALLERGIES: ICD-10-CM

## 2018-05-18 DIAGNOSIS — Z80.42 FAMILY HISTORY OF PROSTATE CANCER: ICD-10-CM

## 2018-05-18 RX ORDER — TADALAFIL 5 MG/1
5 TABLET ORAL DAILY
Qty: 90 TAB | Refills: 3 | Status: SHIPPED | OUTPATIENT
Start: 2018-05-18 | End: 2018-12-07 | Stop reason: SDUPTHER

## 2018-05-18 RX ORDER — DESLORATADINE 5 MG/1
5 TABLET ORAL DAILY
Qty: 90 TAB | Refills: 3 | Status: SHIPPED | OUTPATIENT
Start: 2018-05-18 | End: 2018-12-07 | Stop reason: SDUPTHER

## 2018-05-18 RX ORDER — OMEPRAZOLE 40 MG/1
40 CAPSULE, DELAYED RELEASE ORAL DAILY
Qty: 90 CAP | Refills: 3 | Status: SHIPPED | OUTPATIENT
Start: 2018-05-18 | End: 2018-12-07 | Stop reason: SDUPTHER

## 2018-05-18 RX ORDER — IBUPROFEN 800 MG/1
800 TABLET ORAL
Qty: 180 TAB | Refills: 3 | Status: SHIPPED | OUTPATIENT
Start: 2018-05-18 | End: 2018-12-07 | Stop reason: SDUPTHER

## 2018-05-18 RX ORDER — LISINOPRIL AND HYDROCHLOROTHIAZIDE 20; 25 MG/1; MG/1
1 TABLET ORAL DAILY
Qty: 90 TAB | Refills: 3 | Status: SHIPPED | OUTPATIENT
Start: 2018-05-18 | End: 2018-12-07 | Stop reason: SDUPTHER

## 2018-05-18 RX ORDER — EZETIMIBE AND SIMVASTATIN 10; 20 MG/1; MG/1
1 TABLET ORAL DAILY
Qty: 90 TAB | Refills: 3 | Status: SHIPPED | OUTPATIENT
Start: 2018-05-18 | End: 2018-12-07 | Stop reason: SDUPTHER

## 2018-05-18 NOTE — PROGRESS NOTES
[facility]    Subjective:   Padmini Alba is a 51 y.o. male with history of HTN, HLD, GERD, ED, Herniated Disc  CC: Follow up HTN  History provided by patient and records    HPI:  Hypertension Follow up:  Currently Taking Prinzide 20-25 mg. The patient reports:  taking medications as instructed, no medication side effects noted, no TIA's, no chest pain on exertion, no dyspnea on exertion, no swelling of ankles, no orthostatic dizziness or lightheadedness, no orthopnea or paroxysmal nocturnal dyspnea. Does note some urinary urgency with HCTZ. BP Readings from Last 3 Encounters:   05/18/18 118/77   05/12/17 123/75   09/22/16 120/73     Hypertriglyceridemia Follow up:   Cardiovascular risks for him are: LDL goal is under 100, hypertension, hyperlipidemia, obese. Currently he takes Vytorin ,10-20 mg   Myalgias: No   Fatigue: No   Other side effects: no     Wt Readings from Last 3 Encounters:   05/18/18 285 lb 6.4 oz (129.5 kg)   05/12/17 286 lb (129.7 kg)   09/22/16 283 lb (128.4 kg)       Decreased Stream: Noting over the last year has noted some decreased urinary stream at end of urination. No issue with initiating stream and no blood in urine. GERD: Well controlled on Omeprazole, not exacerbated on NSAIDs    Herniated Disc: Pain well controlled with Motrin daily    Environmental Allergies: Clarinex controls symptoms well    PFSH: Children in High school. Current Outpatient Prescriptions on File Prior to Visit   Medication Sig Dispense Refill    cyclobenzaprine (FLEXERIL) 10 mg tablet Take 1 Tab by mouth three (3) times daily as needed for Muscle Spasm(s). 60 Tab 2    diphenhydrAMINE (BENADRYL) 25 mg capsule Take 1 Cap by mouth every six (6) hours as needed. 30 Cap 2    clobetasol (OLUX) 0.05 % topical foam Apply  to affected area two (2) times a day. use thin film on affected area 1 Can 0     No current facility-administered medications on file prior to visit. Patient Active Problem List   Diagnosis Code    History of herniated intervertebral disc Z87.39    Environmental allergies Z91.09    HTN (hypertension) I10    Hyperlipidemia E78.5    ED (erectile dysfunction) of organic origin N52.9    GERD (gastroesophageal reflux disease) K21.9       Social History     Social History    Marital status:      Spouse name: N/A    Number of children: N/A    Years of education: N/A     Occupational History    Not on file. Social History Main Topics    Smoking status: Never Smoker    Smokeless tobacco: Never Used    Alcohol use Yes      Comment: social    Drug use: No    Sexual activity: Not on file     Other Topics Concern    Not on file     Social History Narrative       Review of Systems   Constitutional: Negative for malaise/fatigue. HENT: Positive for congestion. Eyes: Positive for redness. Respiratory: Negative for shortness of breath. Cardiovascular: Negative for chest pain and leg swelling. Gastrointestinal: Negative for abdominal pain, nausea and vomiting. Neurological: Negative for headaches. Objective:     Visit Vitals    /77    Pulse 68    Temp 100 °F (37.8 °C) (Oral)    Resp 20    Ht 5' 10\" (1.778 m)    Wt 285 lb 6.4 oz (129.5 kg)    SpO2 97%    BMI 40.95 kg/m2          Physical Exam   Constitutional: He appears well-developed and well-nourished. No distress. HENT:   Head: Normocephalic and atraumatic. Eyes: EOM are normal. Pupils are equal, round, and reactive to light. Right conjunctiva is injected. Left conjunctiva is injected. Cardiovascular: Normal rate, regular rhythm, normal heart sounds and intact distal pulses. Exam reveals no gallop and no friction rub. No murmur heard. Pulmonary/Chest: Effort normal and breath sounds normal.   Abdominal: Soft. Bowel sounds are normal. He exhibits no distension. There is no tenderness. Genitourinary: Prostate normal. Prostate is not tender. Musculoskeletal: He exhibits no edema. Nursing note and vitals reviewed. Pertinent Labs/Studies:  Lab Results   Component Value Date/Time    Sodium 134 05/19/2017 08:50 AM    Potassium 4.5 05/19/2017 08:50 AM    Chloride 94 (L) 05/19/2017 08:50 AM    CO2 24 05/19/2017 08:50 AM    Anion gap 8 09/22/2010 09:09 AM    Glucose 107 (H) 05/19/2017 08:50 AM    BUN 14 05/19/2017 08:50 AM    Creatinine 1.09 05/19/2017 08:50 AM    BUN/Creatinine ratio 13 05/19/2017 08:50 AM    GFR est AA 92 05/19/2017 08:50 AM    GFR est non-AA 80 05/19/2017 08:50 AM    Calcium 10.1 05/19/2017 08:50 AM    Bilirubin, total 0.7 05/19/2017 08:50 AM    AST (SGOT) 20 05/19/2017 08:50 AM    Alk. phosphatase 39 05/19/2017 08:50 AM    Protein, total 7.1 05/19/2017 08:50 AM    Albumin 4.3 05/19/2017 08:50 AM    Globulin 3.9 09/22/2010 09:09 AM    A-G Ratio 1.5 05/19/2017 08:50 AM    ALT (SGPT) 23 05/19/2017 08:50 AM       No results found for: HBA1C, HGBE8, NOI6PLMK    Lab Results   Component Value Date/Time    Cholesterol, total 111 05/19/2017 08:50 AM    HDL Cholesterol 31 (L) 05/19/2017 08:50 AM    LDL, calculated 60 05/19/2017 08:50 AM    Triglyceride 98 05/19/2017 08:50 AM    CHOL/HDL Ratio 5.0 09/22/2010 09:09 AM       Lab Results   Component Value Date/Time    WBC 4.9 05/19/2017 08:50 AM    HGB 13.9 05/19/2017 08:50 AM    HCT 41.7 05/19/2017 08:50 AM    PLATELET 408 50/92/3511 08:50 AM    MCV 87 05/19/2017 08:50 AM     Lab Results   Component Value Date/Time    Prostate Specific Ag 1.2 05/19/2017 08:50 AM    Prostate Specific Ag 1.3 10/06/2015 09:55 AM    Prostate Specific Ag 1.2 07/24/2013 09:38 AM    Prostate Specific Ag 1.1 09/22/2010 09:09 AM    Prostate Specific Ag 0.9 02/25/2009 10:41 AM       Assessment and orders:       ICD-10-CM ICD-9-CM    1.  Essential hypertension with goal blood pressure less than 140/90 I10 401.9 lisinopril-hydroCHLOROthiazide (PRINZIDE, ZESTORETIC) 20-25 mg per tablet      CBC W/O DIFF      METABOLIC PANEL, COMPREHENSIVE   2. Gastroesophageal reflux disease without esophagitis K21.9 530.81 omeprazole (PRILOSEC) 40 mg capsule   3. Erectile dysfunction, unspecified erectile dysfunction type N52.9 607.84 tadalafil (CIALIS) 5 mg tablet   4. Mixed hyperlipidemia E78.2 272.2 ezetimibe-simvastatin (VYTORIN 10/20) 10-20 mg per tablet      LIPID PANEL   5. H/O seasonal allergies Z88.9 V15.09 desloratadine (CLARINEX) 5 mg tablet   6. Urinary stream slowing R39.198 788.62 PSA W/ REFLX FREE PSA   7. Family history of prostate cancer Z80.42 V16.42 PSA W/ REFLX FREE PSA   8. Allergic conjunctivitis of both eyes H10.13 372.14    9. History of herniated intervertebral disc Z87.39 V13.59 ibuprofen (MOTRIN) 800 mg tablet     Diagnoses and all orders for this visit:    1. Essential hypertension with goal blood pressure less than 140/90: Very well controlled at this time. COntinue current regimen. Basic labs today  -     lisinopril-hydroCHLOROthiazide (PRINZIDE, ZESTORETIC) 20-25 mg per tablet; Take 1 Tab by mouth daily.  -     CBC W/O DIFF  -     METABOLIC PANEL, COMPREHENSIVE    2. Gastroesophageal reflux disease without esophagitis: Well controlled  -     omeprazole (PRILOSEC) 40 mg capsule; Take 1 Cap by mouth daily. 3. Erectile dysfunction, unspecified erectile dysfunction type: Continue current therapy   -     tadalafil (CIALIS) 5 mg tablet; Take 1 Tab by mouth daily. 4. Mixed hyperlipidemia: Lipid panel and refill of medication  -     ezetimibe-simvastatin (VYTORIN 10/20) 10-20 mg per tablet; Take 1 Tab by mouth daily.  -     LIPID PANEL    5. H/O seasonal allergies: Well controlled  -     desloratadine (CLARINEX) 5 mg tablet; Take 1 Tab by mouth daily. 6. Urinary stream slowing: Normal prostate exam.  Given family hisotry of prostate cancer repeat PSA.  -     PSA W/ REFLX FREE PSA    7. Family history of prostate cancer  -     PSA W/ REFLX FREE PSA    8.  Allergic conjunctivitis of both eyes: Recommended Alaway cream    9. History of herniated intervertebral disc: Well controlled with Motrin. Continue PPI as well. -     ibuprofen (MOTRIN) 800 mg tablet; Take 1 Tab by mouth every eight (8) hours as needed for Pain. Follow-up Disposition:  Return in about 1 month (around 6/18/2018) for Follow up, Well man exam.      I have discussed the diagnosis with the patient and the intended plan as seen in the above orders. Social history, medical history, and labs were reviewed. The patient has received an after-visit summary and questions were answered concerning future plans. I have discussed medication side effects and warnings with the patient as well.     Joyce Broussard MD  Resident [facility]  05/18/18    Patient discussed and seen with Dr. Dione Siegel, Attending Physician

## 2018-05-18 NOTE — PROGRESS NOTES
There are no preventive care reminders to display for this patient. There is no height or weight on file to calculate BMI. 1. Have you been to the ER, urgent care clinic since your last visit? Hospitalized since your last visit? No    2. Have you seen or consulted any other health care providers outside of the [address] [place] [address] since your last visit? Include any pap smears or colon screening.  No  Reviewed record in preparation for visit and have necessary documentation  Pt did not bring medication to office visit for review  Information was given to pt on Advanced Directives, Living Will  Information was given on Shingles Vaccine  opportunity was given for questions  Goals that were addressed and/or need to be completed during or after this appointment include

## 2018-05-18 NOTE — MR AVS SNAPSHOT
[address] [address] [address] [place] [address] [address] 
[phone] Patient: Vinod Swanson MRN: [id number] AQE:2[birth year] Visit Information Date & Time Provider Department Dept. Phone Encounter #  
 5/18/2018  9:20 AM Antonietta Gorman MD  Caitlyn Daniels [id number] Follow-up Instructions Return in about 1 month (around 6/18/2018) for Follow up, Well man exam. Upcoming Health Maintenance Date Due DTaP/Tdap/Td series (2 - Td) 5/20/2025 Allergies as of 5/18/2018  Review Complete On: 5/18/2018 By: Antonietta Gorman MD  
 No Known Allergies Current Immunizations  Never Reviewed Name Date Influenza Vaccine 12/12/2012 Influenza Vaccine (Quad) PF 9/22/2016 Tdap 5/20/2015 Not reviewed this visit You Were Diagnosed With   
  
 Codes Comments Essential hypertension with goal blood pressure less than 140/90    -  Primary ICD-10-CM: I10 
ICD-9-CM: 401.9 Gastroesophageal reflux disease without esophagitis     ICD-10-CM: K21.9 ICD-9-CM: 530.81 Erectile dysfunction, unspecified erectile dysfunction type     ICD-10-CM: N52.9 ICD-9-CM: 607.84 Mixed hyperlipidemia     ICD-10-CM: E78.2 ICD-9-CM: 272.2 H/O seasonal allergies     ICD-10-CM: Z88.9 ICD-9-CM: V15.09 Urinary stream slowing     ICD-10-CM: R39.198 ICD-9-CM: 387.55 Family history of prostate cancer     ICD-10-CM: Z80.42 
ICD-9-CM: V16.42 Allergic conjunctivitis of both eyes     ICD-10-CM: H10.13 ICD-9-CM: 372.14 History of herniated intervertebral disc     ICD-10-CM: Z87.39 
ICD-9-CM: V13.59 Vitals BP Pulse Temp Resp Height(growth percentile) Weight(growth percentile) 118/77 68 100 °F (37.8 °C) (Oral) 20 5' 10\" (1.778 m) 285 lb 6.4 oz (129.5 kg) SpO2 BMI Smoking Status 97% 40.95 kg/m2 Never Smoker Vitals History BMI and BSA Data Body Mass Index Body Surface Area 40.95 kg/m 2 2.53 m 2 Preferred Pharmacy Pharmacy Name Phone Kerrie Bennett, [facility] [phone] Your Updated Medication List  
  
   
This list is accurate as of 5/18/18 10:05 AM.  Always use your most recent med list.  
  
  
  
  
 clobetasol 0.05 % topical foam  
Commonly known as:  OLUX Apply  to affected area two (2) times a day. use thin film on affected area  
  
 cyclobenzaprine 10 mg tablet Commonly known as:  FLEXERIL Take 1 Tab by mouth three (3) times daily as needed for Muscle Spasm(s). desloratadine 5 mg tablet Commonly known as:  CLARINEX Take 1 Tab by mouth daily. diphenhydrAMINE 25 mg capsule Commonly known as:  BENADRYL Take 1 Cap by mouth every six (6) hours as needed. ezetimibe-simvastatin 10-20 mg per tablet Commonly known as:  Vytorin 10/20 Take 1 Tab by mouth daily. ibuprofen 800 mg tablet Commonly known as:  MOTRIN Take 1 Tab by mouth every eight (8) hours as needed for Pain. lisinopril-hydroCHLOROthiazide 20-25 mg per tablet Commonly known as:  Janas Dukes Take 1 Tab by mouth daily. omeprazole 40 mg capsule Commonly known as:  PRILOSEC Take 1 Cap by mouth daily. tadalafil 5 mg tablet Commonly known as:  CIALIS Take 1 Tab by mouth daily. Prescriptions Sent to Pharmacy Refills  
 omeprazole (PRILOSEC) 40 mg capsule 3 Sig: Take 1 Cap by mouth daily. Class: Normal  
 Pharmacy: [address] [place] [address], [address] [place] [address] Ph #: [phone] Route: Oral  
 tadalafil (CIALIS) 5 mg tablet 3 Sig: Take 1 Tab by mouth daily. Class: Normal  
 Pharmacy: [address] [place] [address], [address] [place] [address] Ph #: [phone]  Route: Oral  
 lisinopril-hydroCHLOROthiazide (PRINZIDE, ZESTORETIC) 20-25 mg per tablet 3  
 Sig: Take 1 Tab by mouth daily. Class: Normal  
 Pharmacy: [address] , [facility] Ph #: [phone] Route: Oral  
 ibuprofen (MOTRIN) 800 mg tablet 3 Sig: Take 1 Tab by mouth every eight (8) hours as needed for Pain. Class: Normal  
 [facility], [address] [place] [address] Ph #: [phone] Route: Oral  
 ezetimibe-simvastatin (VYTORIN 10/20) 10-20 mg per tablet 3 Sig: Take 1 Tab by mouth daily. Class: Normal  
 Pharmacy: [address] [facility], [address] [place] [address] Ph #: [phone] Route: Oral  
 desloratadine (CLARINEX) 5 mg tablet 3 Sig: Take 1 Tab by mouth daily. Class: Normal  
 Pharmacy: [address] [facility], [address] [place] [address] Ph #: [phone] Route: Oral  
  
We Performed the Following CBC W/O DIFF [61057 CPT(R)] LIPID PANEL [52002 CPT(R)] METABOLIC PANEL, COMPREHENSIVE [16464 CPT(R)] PSA W/ REFLX FREE PSA [40595 CPT(R)] Follow-up Instructions Return in about 1 month (around 6/18/2018) for Follow up, Well man exam.  
  
  
Patient Instructions [place] for Allergic Conjunctivitis Introducing [facility] & HEALTH [facility]! [facility] introduces [employer] patient portal. Now you can access parts of your medical record, email your doctor's office, and request medication refills online. 1. In your internet browser, go to [email or web address] 2. Click on the First Time User? Click Here link in the Sign In box. You will see the New Member Sign Up page. 3. Enter your [employer] Access Code exactly as it appears below. You will not need to use this code after youve completed the sign-up process. If you do not sign up before the expiration date, you must request a new code. · Photos [employer] Photos Access Code: [email or web address] Expires: 8/16/2018  9:06 AM 
 
 4. Enter the last four digits of your Social Security Number (xxxx) and Date of Birth (mm/dd/yyyy) as indicated and click Submit. You will be taken to the next sign-up page. 5. Create a InMobi ID. This will be your InMobi login ID and cannot be changed, so think of one that is secure and easy to remember. 6. Create a InMobi password. You can change your password at any time. 7. Enter your Password Reset Question and Answer. This can be used at a later time if you forget your password. 8. Enter your e-mail address. You will receive e-mail notification when new information is available in [address] 9. Click Sign Up. You can now view and download portions of your medical record. 10. Click the Download Summary menu link to download a portable copy of your medical information. If you have questions, please visit the Frequently Asked Questions section of the InMobi website. Remember, InMobi is NOT to be used for urgent needs. For medical emergencies, dial 911. Now available from your iPhone and Android! Please provide this summary of care documentation to your next provider. Your primary care clinician is listed as [address]. If you have any questions after today's visit, please call [phone].

## 2018-05-19 LAB
ALBUMIN SERPL-MCNC: 4.6 G/DL (ref 3.5–5.5)
ALBUMIN/GLOB SERPL: 1.5 {RATIO} (ref 1.2–2.2)
ALP SERPL-CCNC: 38 IU/L (ref 39–117)
ALT SERPL-CCNC: 28 IU/L (ref 0–44)
AST SERPL-CCNC: 31 IU/L (ref 0–40)
BILIRUB SERPL-MCNC: 0.9 MG/DL (ref 0–1.2)
BUN SERPL-MCNC: 11 MG/DL (ref 6–24)
BUN/CREAT SERPL: 11 (ref 9–20)
CALCIUM SERPL-MCNC: 10.9 MG/DL (ref 8.7–10.2)
CHLORIDE SERPL-SCNC: 96 MMOL/L (ref 96–106)
CHOLEST SERPL-MCNC: 136 MG/DL (ref 100–199)
CO2 SERPL-SCNC: 22 MMOL/L (ref 18–29)
CREAT SERPL-MCNC: 1.02 MG/DL (ref 0.76–1.27)
ERYTHROCYTE [DISTWIDTH] IN BLOOD BY AUTOMATED COUNT: 13.7 % (ref 12.3–15.4)
GFR SERPLBLD CREATININE-BSD FMLA CKD-EPI: 86 ML/MIN/1.73
GFR SERPLBLD CREATININE-BSD FMLA CKD-EPI: 99 ML/MIN/1.73
GLOBULIN SER CALC-MCNC: 3 G/DL (ref 1.5–4.5)
GLUCOSE SERPL-MCNC: 105 MG/DL (ref 65–99)
HCT VFR BLD AUTO: 43.6 % (ref 37.5–51)
HDLC SERPL-MCNC: 32 MG/DL
HGB BLD-MCNC: 14.7 G/DL (ref 13–17.7)
LDLC SERPL CALC-MCNC: 69 MG/DL (ref 0–99)
MCH RBC QN AUTO: 29.5 PG (ref 26.6–33)
MCHC RBC AUTO-ENTMCNC: 33.7 G/DL (ref 31.5–35.7)
MCV RBC AUTO: 88 FL (ref 79–97)
PLATELET # BLD AUTO: 286 X10E3/UL (ref 150–379)
POTASSIUM SERPL-SCNC: 4.5 MMOL/L (ref 3.5–5.2)
PROT SERPL-MCNC: 7.6 G/DL (ref 6–8.5)
PSA SERPL-MCNC: 1.3 NG/ML (ref 0–4)
RBC # BLD AUTO: 4.98 X10E6/UL (ref 4.14–5.8)
REFLEX CRITERIA: NORMAL
SODIUM SERPL-SCNC: 134 MMOL/L (ref 134–144)
TRIGL SERPL-MCNC: 173 MG/DL (ref 0–149)
VLDLC SERPL CALC-MCNC: 35 MG/DL (ref 5–40)
WBC # BLD AUTO: 4.4 X10E3/UL (ref 3.4–10.8)

## 2018-05-22 NOTE — PROGRESS NOTES
CBC and CMP unremarkable. Non fasting lipid panel, triglycerides within previous range and HDL within normal range for patient. No changes to therapy at this time. PSA remains stable and wnl.

## 2018-05-25 PROBLEM — E66.01 OBESITY, MORBID (HCC): Status: ACTIVE | Noted: 2018-05-25

## 2018-06-12 ENCOUNTER — OFFICE VISIT (OUTPATIENT)
Dept: FAMILY MEDICINE CLINIC | Age: 48
End: 2018-06-12

## 2018-06-12 VITALS
DIASTOLIC BLOOD PRESSURE: 71 MMHG | TEMPERATURE: 98.4 F | BODY MASS INDEX: 40.09 KG/M2 | HEART RATE: 60 BPM | OXYGEN SATURATION: 95 % | RESPIRATION RATE: 18 BRPM | WEIGHT: 280 LBS | SYSTOLIC BLOOD PRESSURE: 105 MMHG | HEIGHT: 70 IN

## 2018-06-12 DIAGNOSIS — Z00.00 VISIT FOR WELL MAN HEALTH CHECK: Primary | ICD-10-CM

## 2018-06-12 RX ORDER — CHLORHEXIDINE GLUCONATE 1.2 MG/ML
RINSE ORAL
Refills: 5 | COMMUNITY
Start: 2018-03-09 | End: 2018-12-07 | Stop reason: SDUPTHER

## 2018-06-12 NOTE — MR AVS SNAPSHOT
[address] [address] 
[phone] Patient: Antonieta Bo MRN: [id number] EYO:2/57/1956 Visit Information Date & Time Provider Department Dept. Phone Encounter #  
 6/12/2018  1:00 PM Heidi Tobar MD [address] [place] [address] [id number] Follow-up Instructions Return in about 1 month (around 7/12/2018) for Skin Tags. Upcoming Health Maintenance Date Due DTaP/Tdap/Td series (2 - Td) 5/20/2025 Allergies as of 6/12/2018  Review Complete On: 6/12/2018 By: Heidi Tobar MD  
 No Known Allergies Current Immunizations  Never Reviewed Name Date Influenza Vaccine 12/12/2012 Influenza Vaccine (Quad) PF 9/22/2016 Tdap 5/20/2015 Not reviewed this visit You Were Diagnosed With   
  
 Codes Comments Visit for [facility] health check    -  Primary ICD-10-CM: Z00.00 ICD-9-CM: V70.0 Vitals BP Pulse Temp Resp Height(growth percentile) Weight(growth percentile) 105/71 (BP 1 Location: Left arm, BP Patient Position: Sitting) 60 98.4 °F (36.9 °C) (Oral) 18 5' 10\" (1.778 m) 280 lb (127 kg) SpO2 BMI Smoking Status 95% 40.18 kg/m2 Never Smoker Vitals History BMI and BSA Data Body Mass Index Body Surface Area  
 40.18 kg/m 2 2.5 m 2 Preferred Pharmacy Pharmacy Name Phone Kerrie Bennett, [facility] [phone] Your Updated Medication List  
  
   
This list is accurate as of 6/12/18  1:36 PM.  Always use your most recent med list.  
  
  
  
  
 chlorhexidine 0.12 % solution Commonly known as:  PERIDEX RINSE MOUTH WITH 15 ML FOR 30 SEC AND SPIT. USE EVERY 12 HOURS AFTER BRUSHING AND FLOSSING  
  
 cyclobenzaprine 10 mg tablet Commonly known as:  FLEXERIL Take 1 Tab by mouth three (3) times daily as needed for Muscle Spasm(s). desloratadine 5 mg tablet Commonly known as:  CLARINEX Take 1 Tab by mouth daily. ezetimibe-simvastatin 10-20 mg per tablet Commonly known as:  Vytorin 10/20 Take 1 Tab by mouth daily. ibuprofen 800 mg tablet Commonly known as:  MOTRIN Take 1 Tab by mouth every eight (8) hours as needed for Pain. lisinopril-hydroCHLOROthiazide 20-25 mg per tablet Commonly known as:  Viva Deal Take 1 Tab by mouth daily. omeprazole 40 mg capsule Commonly known as:  PRILOSEC Take 1 Cap by mouth daily. tadalafil 5 mg tablet Commonly known as:  CIALIS Take 1 Tab by mouth daily. Follow-up Instructions Return in about 1 month (around 7/12/2018) for Skin Tags. Patient Instructions Well Visit, Men 48 to 72: Care Instructions Your Care Instructions Physical exams can help you stay healthy. Your doctor has checked your overall health and may have suggested ways to take good care of yourself. He or she also may have recommended tests. At home, you can help prevent illness with healthy eating, regular exercise, and other steps. Follow-up care is a key part of your treatment and safety. Be sure to make and go to all appointments, and call your doctor if you are having problems. It's also a good idea to know your test results and keep a list of the medicines you take. How can you care for yourself at home? · Reach and stay at a healthy weight. This will lower your risk for many problems, such as obesity, diabetes, heart disease, and high blood pressure. · Get at least 30 minutes of exercise on most days of the week. Walking is a good choice. You also may want to do other activities, such as running, swimming, cycling, or playing tennis or team sports. · Do not smoke. Smoking can make health problems worse. If you need help quitting, talk to your doctor about stop-smoking programs and medicines. These can increase your chances of quitting for good. · Protect your skin from too much sun. When you're outdoors from 10 a.m. to 4 p.m., stay in the shade or cover up with clothing and a hat with a wide brim. Wear sunglasses that block UV rays. Even when it's cloudy, put broad-spectrum sunscreen (SPF 30 or higher) on any exposed skin. · See a dentist one or two times a year for checkups and to have your teeth cleaned. · Wear a seat belt in the car. · Limit alcohol to 2 drinks a day. Too much alcohol can cause health problems. Follow your doctor's advice about when to have certain tests. These tests can spot problems early. · Cholesterol. Your doctor will tell you how often to have this done based on your overall health and other things that can increase your risk for heart attack and stroke. · Blood pressure. Have your blood pressure checked during a routine doctor visit. Your doctor will tell you how often to check your blood pressure based on your age, your blood pressure results, and other factors. · Prostate exam. Talk to your doctor about whether you should have a blood test (called a PSA test) for prostate cancer. Experts disagree on whether men should have this test. Some experts recommend that you discuss the benefits and risks of the test with your doctor. · Diabetes. Ask your doctor whether you should have tests for diabetes. · Vision. Some experts recommend that you have yearly exams for glaucoma and other age-related eye problems starting at age 48. · Hearing. Tell your doctor if you notice any change in your hearing. You can have tests to find out how well you hear. · Colon cancer. You should begin tests for colon cancer at age 48. You may have one of several tests. Your doctor will tell you how often to have tests based on your age and risk.  Risks include whether you already had a precancerous polyp removed from your colon or whether your parent, brother, sister, or child has had colon cancer. · Heart attack and stroke risk. At least every 4 to 6 years, you should have your risk for heart attack and stroke assessed. Your doctor uses factors such as your age, blood pressure, cholesterol, and whether you smoke or have diabetes to show what your risk for a heart attack or stroke is over the next 10 years. · Abdominal aortic aneurysm. Ask your doctor whether you should have a test to check for an aneurysm. You may need a test if you ever smoked or if your parent, brother, sister, or child has had an aneurysm. When should you call for help? Watch closely for changes in your health, and be sure to contact your doctor if you have any problems or symptoms that concern you. Where can you learn more? Go to http://tea-ana.info/. Enter C416 in the search box to learn more about \"Well Visit, Men 48 to 72: Care Instructions. \" Current as of: May 12, 2017 Content Version: 11.4 © 5662-7802 [employer]. Care instructions adapted under license by [employer] (which disclaims liability or warranty for this information). If you have questions about a medical condition or this instruction, always ask your healthcare professional. [address] [address] any warranty or liability for your use of this information. Introducing [facility]! B[facility] Wagner introduces [employer] patient portal. Now you can access parts of your medical record, email your doctor's office, and request medication refills online. 1. In your internet browser, go to [email or web address] 2. Click on the First Time User? Click Here link in the Sign In box. You will see the New Member Sign Up page. 3. Enter your [employer] Access Code exactly as it appears below. You will not need to use this code after youve completed the sign-up process.  If you do not sign up before the expiration date, you must request a new code. 
 
· [employer] Access Code: [email or web address] Expires: 8/16/2018  9:06 AM 
 
4. Enter the last four digits of your Social Security Number (xxxx) and Date of Birth (mm/dd/yyyy) as indicated and click Submit. You will be taken to the next sign-up page. 5. Create a [employer] ID. This will be your [employer] login ID and cannot be changed, so think of one that is secure and easy to remember. 6. Create a [employer] password. You can change your password at any time. 7. Enter your Password Reset Question and Answer. This can be used at a later time if you forget your password. 8. Enter your e-mail address. You will receive e-mail notification when new information is available in [address] 9. Click Sign Up. You can now view and download portions of your medical record. 10. Click the Download Summary menu link to download a portable copy of your medical information. If you have questions, please visit the Frequently Asked Questions section of the [employer] website. Remember, [employer] is NOT to be used for urgent needs. For medical emergencies, dial 911. Now available from your iPhone and Android! Please provide this summary of care documentation to your next provider. Your primary care clinician is listed as Aman Smith. If you have any questions after today's visit, please call [phone].

## 2018-06-12 NOTE — PROGRESS NOTES
[facility]    Subjective:   Calvin Merino is a 51 y.o. male with history of GERD, HTN  CC: Well male  History provided by patient and records    HPI:    Calvin Merino is a 51 y.o. male  Presenting for his annual checkup. Patient is interested in getting skin tags removed. Has multiple skin tags around the neck, next to eyes. Most irritating areas are at base of neck and next to eye. Would like removed when able. Last screening colonoscopy: Not due until 47 y.o. Last digital rectal exam: 4 years ago  Vaccinations reviewed and up-to date    [facility] Reviewed, no significant changes    Current Outpatient Prescriptions on File Prior to Visit   Medication Sig Dispense Refill    omeprazole (PRILOSEC) 40 mg capsule Take 1 Cap by mouth daily. 90 Cap 3    tadalafil (CIALIS) 5 mg tablet Take 1 Tab by mouth daily. 90 Tab 3    lisinopril-hydroCHLOROthiazide (PRINZIDE, ZESTORETIC) 20-25 mg per tablet Take 1 Tab by mouth daily. 90 Tab 3    ibuprofen (MOTRIN) 800 mg tablet Take 1 Tab by mouth every eight (8) hours as needed for Pain. 180 Tab 3    ezetimibe-simvastatin (VYTORIN 10/20) 10-20 mg per tablet Take 1 Tab by mouth daily. 90 Tab 3    desloratadine (CLARINEX) 5 mg tablet Take 1 Tab by mouth daily. 90 Tab 3    cyclobenzaprine (FLEXERIL) 10 mg tablet Take 1 Tab by mouth three (3) times daily as needed for Muscle Spasm(s). 60 Tab 2     No current facility-administered medications on file prior to visit.         Patient Active Problem List   Diagnosis Code    History of herniated intervertebral disc Z87.39    Environmental allergies Z91.09    HTN (hypertension) I10    Hyperlipidemia E78.5    ED (erectile dysfunction) of organic origin N52.9    GERD (gastroesophageal reflux disease) K21.9    Obesity, morbid ([address] E66.01       Social History     Social History    Marital status:      Spouse name: N/A    Number of children: N/A    Years of education: N/A Occupational History    Not on file. Social History Main Topics    Smoking status: Never Smoker    Smokeless tobacco: Never Used    Alcohol use Yes      Comment: social    Drug use: No    Sexual activity: Not on file     Other Topics Concern    Not on file     Social History Narrative       Review of Systems   Constitutional: Negative for chills, fever and malaise/fatigue. HENT: Negative for congestion. Respiratory: Negative for cough. Cardiovascular: Negative for chest pain and palpitations. Gastrointestinal: Negative for abdominal pain, nausea and vomiting. Neurological: Negative for dizziness and headaches. Psychiatric/Behavioral: Negative for depression. Objective:     Visit Vitals    /71 (BP 1 Location: Left arm, BP Patient Position: Sitting)    Pulse 60    Temp 98.4 °F (36.9 °C) (Oral)    Resp 18    Ht 5' 10\" (1.778 m)    Wt 280 lb (127 kg)    SpO2 95%    BMI 40.18 kg/m2          Physical Exam   Constitutional: He appears well-developed and well-nourished. No distress. Neck: Normal range of motion. Neck supple. Cardiovascular: Normal rate, regular rhythm, normal heart sounds and intact distal pulses. Exam reveals no gallop and no friction rub. No murmur heard. Pulmonary/Chest: Effort normal and breath sounds normal.   Abdominal: Soft. Bowel sounds are normal. He exhibits no distension. Nursing note and vitals reviewed. Pertinent Labs/Studies:      Assessment and orders:       ICD-10-CM ICD-9-CM    1. Visit for [facility] health check Z00.00 V70.0      Diagnoses and all orders for this visit:    1. Visit for [facility] health check: No acute issues, no acute HM to deal with. [place] paperwork completed for monitoring of HTN. Follow up in one month for chronic conditons and skin tag removal      Follow-up Disposition:  Return in about 1 month (around 7/12/2018) for Skin Tags.       I have discussed the diagnosis with the patient and the intended plan as seen in the above orders. Social history, medical history, and labs were reviewed. The patient has received an after-visit summary and questions were answered concerning future plans. I have discussed medication side effects and warnings with the patient as well.     Sho Garcia MD  Resident [facility] A[facility]ANDRA [facility]  06/12/18    Patient discussed with Dr. Carly Lugo, Attending Physician

## 2018-06-12 NOTE — PROGRESS NOTES
Chief Complaint   Patient presents with    Well Male     1. Have you been to the ER, urgent care clinic since your last visit? Hospitalized since your last visit? No    2. Have you seen or consulted any other health care providers outside of the [facility] since your last visit? Include any pap smears or colon screening.  No

## 2018-06-12 NOTE — PATIENT INSTRUCTIONS

## 2018-06-15 NOTE — PROGRESS NOTES
I reviewed with the resident the medical history and the resident's findings on the physical examination. I discussed with the resident the patient's diagnosis and concur with the plan. Of note, pt was inappropriate with the female staff during check-in/rooming.

## 2018-07-12 ENCOUNTER — OFFICE VISIT (OUTPATIENT)
Dept: FAMILY MEDICINE CLINIC | Age: 48
End: 2018-07-12

## 2018-07-12 VITALS
OXYGEN SATURATION: 97 % | TEMPERATURE: 97.7 F | SYSTOLIC BLOOD PRESSURE: 101 MMHG | RESPIRATION RATE: 20 BRPM | WEIGHT: 278.2 LBS | BODY MASS INDEX: 39.83 KG/M2 | DIASTOLIC BLOOD PRESSURE: 63 MMHG | HEART RATE: 67 BPM | HEIGHT: 70 IN

## 2018-07-12 DIAGNOSIS — L91.8 SKIN TAGS, MULTIPLE ACQUIRED: Primary | ICD-10-CM

## 2018-07-12 NOTE — MR AVS SNAPSHOT
[address] [id number] 
[phone] Patient: Brook Patterson MRN: [id number] OVM:0/68/5493 Visit Information Date & Time Provider Department Dept. Phone Encounter #  
 7/12/2018 11:00 AM Chele Davis MD 7 [facility]ait[facility] [id number] Follow-up Instructions Return in about 2 weeks (around 7/26/2018) for Skin tags. Upcoming Health Maintenance Date Due DTaP/Tdap/Td series (2 - Td) 5/20/2025 Allergies as of 7/12/2018  Review Complete On: 7/12/2018 By: Chele Davis MD  
 No Known Allergies Current Immunizations  Never Reviewed Name Date Influenza Vaccine 12/12/2012 Influenza Vaccine (Quad) PF 9/22/2016 Tdap 5/20/2015 Not reviewed this visit You Were Diagnosed With   
  
 Codes Comments Skin tags, multiple acquired    -  Primary ICD-10-CM: L91.8 ICD-9-CM: 701.9 Vitals BP Pulse Temp Resp Height(growth percentile) Weight(growth percentile) 101/63 67 97.7 °F (36.5 °C) (Oral) 20 5' 10\" (1.778 m) 278 lb 3.2 oz (126.2 kg) SpO2 BMI Smoking Status 97% 39.92 kg/m2 Never Smoker Vitals History BMI and BSA Data Body Mass Index Body Surface Area  
 39.92 kg/m 2 2.5 m 2 Preferred Pharmacy Pharmacy Name Phone Kerrie Bennett, [facility] [phone] Your Updated Medication List  
  
   
This list is accurate as of 7/12/18 12:06 PM.  Always use your most recent med list.  
  
  
  
  
 chlorhexidine 0.12 % solution Commonly known as:  PERIDEX RINSE MOUTH WITH 15 ML FOR 30 SEC AND SPIT. USE EVERY 12 HOURS AFTER BRUSHING AND FLOSSING  
  
 cyclobenzaprine 10 mg tablet Commonly known as:  FLEXERIL Take 1 Tab by mouth three (3) times daily as needed for Muscle Spasm(s). desloratadine 5 mg tablet Commonly known as:  CLARINEX Take 1 Tab by mouth daily. ezetimibe-simvastatin 10-20 mg per tablet Commonly known as:  Vytorin 10/20 Take 1 Tab by mouth daily. ibuprofen 800 mg tablet Commonly known as:  MOTRIN Take 1 Tab by mouth every eight (8) hours as needed for Pain. lisinopril-hydroCHLOROthiazide 20-25 mg per tablet Commonly known as:  Climmie Leena Take 1 Tab by mouth daily. omeprazole 40 mg capsule Commonly known as:  PRILOSEC Take 1 Cap by mouth daily. tadalafil 5 mg tablet Commonly known as:  CIALIS Take 1 Tab by mouth daily. Follow-up Instructions Return in about 2 weeks (around 7/26/2018) for Skin tags. Introducing [facility]! [employer] introduces [employer] patient portal. Now you can access parts of your medical record, email your doctor's office, and request medication refills online. 1. In your internet browser, go to [email or web address] 2. Click on the First Time User? Click Here link in the Sign In box. You will see the New Member Sign Up page. 3. Enter your tweetTV Access Code exactly as it appears below. You will not need to use this code after youve completed the sign-up process. If you do not sign up before the expiration date, you must request a new code. · tweetTV Access Code: [email or web address] Expires: 8/16/2018  9:06 AM 
 
4. Enter the last four digits of your Social Security Number (xxxx) and Date of Birth (mm/dd/yyyy) as indicated and click Submit. You will be taken to the next sign-up page. 5. Create a [employer] ID. This will be your tweetTV login ID and cannot be changed, so think of one that is secure and easy to remember. 6. Create a [employer] password. You can change your password at any time. 7. Enter your Password Reset Question and Answer. This can be used at a later time if you forget your password. 8. Enter your e-mail address. You will receive e-mail notification when new information is available in [address] 9. Click Sign Up. You can now view and download portions of your medical record. 10. Click the Download Summary menu link to download a portable copy of your medical information. If you have questions, please visit the Frequently Asked Questions section of the [employer] website. Remember, [employer] is NOT to be used for urgent needs. For medical emergencies, dial 911. Now available from your iPhone and Android! Please provide this summary of care documentation to your next provider. Your primary care clinician is listed as Nay Ordoñez. If you have any questions after today's visit, please call [phone].

## 2018-07-12 NOTE — PROGRESS NOTES
Clinic Procedure Note:    Procedure performed: Crytherapy Lesion destruction    Indications: Irritation/Cosmetic    Date of procedure: 07/12/18    Chart reviewed for the following:              Daphney Galan MD, have reviewed the History, Physical and updated the Allergic reactions for [address] [place] [address] performed immediately prior to start of procedure:              Daphney Galan MD, have performed the following reviews on Odalys Chapman prior to the start of the procedure, see attached form in media. Risks and benefits of procedure were discussed with patient prior to proceeding with treatment. Procedure:  After consent was obtained, Lesions for destruction were identified, 4 on the right neck and 1 on the left cheek/face. Lesions were treated with 3 treatment of 5-10 seconds of liquid nitrogen each. Procedure performed by:   Tatyana Barcenas MD  Provider assisted by: Danyel Rahman MD   Patient assisted by: self     How tolerated by patient: tolerated the procedure well with no complications    Comments: none    Tatyana Barcenas MD  Resident [facility] [place]

## 2018-07-12 NOTE — PROGRESS NOTES
There are no preventive care reminders to display for this patient. Body mass index is 39.92 kg/(m^2). 1. Have you been to the ER, urgent care clinic since your last visit? Hospitalized since your last visit? No    2. Have you seen or consulted any other health care providers outside of the [address] [place] [address] since your last visit? Include any pap smears or colon screening.  No  Reviewed record in preparation for visit and have necessary documentation  Pt did not bring medication to office visit for review  Information was given to pt on Advanced Directives, Living Will  Information was given on Shingles Vaccine  opportunity was given for questions  Goals that were addressed and/or need to be completed during or after this appointment include

## 2018-07-14 NOTE — PROGRESS NOTES
I discussed the findings, assessment and plan in detail with the resident and agree with the resident's findings and plan as documented in the resident's note. Rach Finn M.D.

## 2018-08-07 ENCOUNTER — OFFICE VISIT (OUTPATIENT)
Dept: FAMILY MEDICINE CLINIC | Age: 48
End: 2018-08-07

## 2018-08-07 VITALS
HEART RATE: 71 BPM | WEIGHT: 282 LBS | SYSTOLIC BLOOD PRESSURE: 106 MMHG | OXYGEN SATURATION: 98 % | BODY MASS INDEX: 40.37 KG/M2 | DIASTOLIC BLOOD PRESSURE: 76 MMHG | RESPIRATION RATE: 20 BRPM | HEIGHT: 70 IN | TEMPERATURE: 98.6 F

## 2018-08-07 DIAGNOSIS — R07.81 RIB PAIN ON RIGHT SIDE: Primary | ICD-10-CM

## 2018-08-07 DIAGNOSIS — L91.8 SKIN TAGS, MULTIPLE ACQUIRED: ICD-10-CM

## 2018-08-07 NOTE — PROGRESS NOTES
[facility]    Subjective:   Eris Milligan is a 51 y.o. male with history of GERD, HTN, skin tags  CC: Follow up skin tags, rib pain  History provided by patient and Records    HPI:  Skin tags: Healing well after cryotherapy. No bleeding, drainage, or other abnormalities. Rib pain: Initially developed after falling onto a chair while playing with son about a month ago. Initially constant aching pain that was exacerbated by deep breathing and touch. Improved and now only occurs when laying on stomach at night, causing an acute aching or sharp pain. Not tender otherwise. Has not tried any medication for the pain at this time. PFSH:     Current Outpatient Prescriptions on File Prior to Visit   Medication Sig Dispense Refill    chlorhexidine (PERIDEX) 0.12 % solution RINSE MOUTH WITH 15 ML FOR 30 SEC AND SPIT. USE EVERY 12 HOURS AFTER BRUSHING AND FLOSSING  5    omeprazole (PRILOSEC) 40 mg capsule Take 1 Cap by mouth daily. 90 Cap 3    tadalafil (CIALIS) 5 mg tablet Take 1 Tab by mouth daily. 90 Tab 3    lisinopril-hydroCHLOROthiazide (PRINZIDE, ZESTORETIC) 20-25 mg per tablet Take 1 Tab by mouth daily. 90 Tab 3    ibuprofen (MOTRIN) 800 mg tablet Take 1 Tab by mouth every eight (8) hours as needed for Pain. 180 Tab 3    ezetimibe-simvastatin (VYTORIN 10/20) 10-20 mg per tablet Take 1 Tab by mouth daily. 90 Tab 3    desloratadine (CLARINEX) 5 mg tablet Take 1 Tab by mouth daily. 90 Tab 3    cyclobenzaprine (FLEXERIL) 10 mg tablet Take 1 Tab by mouth three (3) times daily as needed for Muscle Spasm(s). 60 Tab 2     No current facility-administered medications on file prior to visit.         Patient Active Problem List   Diagnosis Code    History of herniated intervertebral disc Z87.39    Environmental allergies Z91.09    HTN (hypertension) I10    Hyperlipidemia E78.5    ED (erectile dysfunction) of organic origin N52.9    GERD (gastroesophageal reflux disease) K21.9    Ob[facility] morbid [facility] E66.01       Social History     Social History    Marital status:      Spouse name: N/A    Number of children: N/A    Years of education: N/A     Occupational History    Not on file. Social History Main Topics    Smoking status: Never Smoker    Smokeless tobacco: Never Used    Alcohol use Yes      Comment: social    Drug use: No    Sexual activity: Not on file     Other Topics Concern    Not on file     Social History Narrative       Review of Systems   Constitutional: Negative for chills and fever. Respiratory: Negative for cough and shortness of breath. Cardiovascular: Negative for chest pain. Gastrointestinal: Negative for abdominal pain, nausea and vomiting. Objective:     Visit Vitals    /76 (BP 1 Location: Right arm, BP Patient Position: Sitting)    Pulse 71    Temp 98.6 °F (37 °C) (Oral)    Resp 20    Ht 5' 10\" (1.778 m)    Wt 282 lb (127.9 kg)    SpO2 98%    BMI 40.46 kg/m2          Physical Exam   Constitutional: He appears well-developed and well-nourished. No distress. Cardiovascular: Normal rate, regular rhythm, normal heart sounds and intact distal pulses. Pulmonary/Chest: Effort normal and breath sounds normal. He exhibits no tenderness and no bony tenderness. Abdominal: Soft. Bowel sounds are normal.   Skin: Skin is warm and dry. Well healing areas of chryotherapy   Nursing note and vitals reviewed. Pertinent Labs/Studies:      Assessment and orders:       ICD-10-CM ICD-9-CM    1. Rib pain on right side R07.81 786.50    2. Skin tags, multiple acquired L91.8 701.9      Diagnoses and all orders for this visit:    1. Rib pain on right side: MSK related, abvised on use of NSAID for 1-2 weeks scheduled and see if improves pain. May also try OTC Salonpas. Follow up if no improvement, and consider imaging if needed.     2. Skin tags, multiple acquired: Well healing, no other lesions at this time interested in removing. Follow-up Disposition:  Return in about 3 months (around 11/7/2018) for Follow chronic conditions. I have discussed the diagnosis with the patient and the intended plan as seen in the above orders. Social history, medical history, and labs were reviewed. The patient has received an after-visit summary and questions were answered concerning future plans. I have discussed medication side effects and warnings with the patient as well.     Hoda Rico MD  Resident [facility]  08/07/18    Patient discussed with Dr. Cullen Mays, Attending Physician

## 2018-08-07 NOTE — PROGRESS NOTES
1. Have you been to the ER, urgent care clinic since your last visit? Hospitalized since your last visit? No    2. Have you seen or consulted any other health care providers outside of the [address] [place] [address] since your last visit? Include any pap smears or colon screening. No  Reviewed record in preparation for visit and have necessary documentation  Pt did not bring medication to office visit for review    Goals that were addressed and/or need to be completed during or after this appointment include   There are no preventive care reminders to display for this patient.

## 2018-08-07 NOTE — MR AVS SNAPSHOT
[address] [address] 
[phone] Patient: Sherin Urrutia MRN: [id number] YSJ:2[birth year]80/[birth year] Visit Information Date & Time Provider Department Dept. Phone Encounter #  
 8/7/2018  1:30 PM Howard Layton MD  [place] [facility] [id number] Follow-up Instructions Return in about 3 months (around 11/7/2018) for Follow chronic conditions. Upcoming Health Maintenance Date Due DTaP/Tdap/Td series (2 - Td) 5/20/2025 Allergies as of 8/7/2018  Review Complete On: 8/7/2018 By: Howard Layton MD  
 No Known Allergies Current Immunizations  Never Reviewed Name Date Influenza Vaccine 12/12/2012 Influenza Vaccine (Quad) PF 9/22/2016 Tdap 5/20/2015 Not reviewed this visit You Were Diagnosed With   
  
 Codes Comments Rib pain on right side    -  Primary ICD-10-CM: R07.81 ICD-9-CM: 786.50 Skin tags, multiple acquired     ICD-10-CM: L91.8 ICD-9-CM: 701.9 Vitals BP Pulse Temp Resp Height(growth percentile) Weight(growth percentile) 106/76 (BP 1 Location: Right arm, BP Patient Position: Sitting) 71 98.6 °F (37 °C) (Oral) 20 5' 10\" (1.778 m) 282 lb (127.9 kg) SpO2 BMI Smoking Status 98% 40.46 kg/m2 Never Smoker Vitals History BMI and BSA Data Body Mass Index Body Surface Area 40.46 kg/m 2 2.51 m 2 Preferred Pharmacy Pharmacy Name Phone Kerrie Bennett, [facility] [phone] Your Updated Medication List  
  
   
This list is accurate as of 8/7/18  2:08 PM.  Always use your most recent med list.  
  
  
  
  
 chlorhexidine 0.12 % solution Commonly known as:  PERIDEX RINSE MOUTH WITH 15 ML FOR 30 SEC AND SPIT. USE EVERY 12 HOURS AFTER BRUSHING AND FLOSSING  
  
 cyclobenzaprine 10 mg tablet Commonly known as:  FLEXERIL  
 Take 1 Tab by mouth three (3) times daily as needed for Muscle Spasm(s). desloratadine 5 mg tablet Commonly known as:  CLARINEX Take 1 Tab by mouth daily. ezetimibe-simvastatin 10-20 mg per tablet Commonly known as:  Vytorin 10/20 Take 1 Tab by mouth daily. ibuprofen 800 mg tablet Commonly known as:  MOTRIN Take 1 Tab by mouth every eight (8) hours as needed for Pain. lisinopril-hydroCHLOROthiazide 20-25 mg per tablet Commonly known as:  [place] Take 1 Tab by mouth daily. omeprazole 40 mg capsule Commonly known as:  PRILOSEC Take 1 Cap by mouth daily. tadalafil 5 mg tablet Commonly known as:  CIALIS Take 1 Tab by mouth daily. Follow-up Instructions Return in about 3 months (around 11/7/2018) for Follow chronic conditions. Patient Instructions Motrin or Aleve 2 times day for about a week. Salonpas OTC as needed on the pain site. Please provide this summary of care documentation to your next provider. Your primary care clinician is listed as Nay Ordoñez. If you have any questions after today's visit, please call [phone].

## 2018-09-27 ENCOUNTER — CLINICAL SUPPORT (OUTPATIENT)
Dept: FAMILY MEDICINE CLINIC | Age: 48
End: 2018-09-27

## 2018-09-27 VITALS — TEMPERATURE: 98.7 F

## 2018-09-27 DIAGNOSIS — Z23 ENCOUNTER FOR IMMUNIZATION: Primary | ICD-10-CM

## 2018-09-27 NOTE — MR AVS SNAPSHOT
[address] [address] 
[phone] Patient: Deshawn Tiwari MRN: [id number] AIL:4[birth year]80[birth year] Visit Information Date & Time Provider Department Dept. Phone Encounter #  
 9/27/2018  1:50 PM Carolina Alston MD [facility] [id number] Upcoming Health Maintenance Date Due DTaP/Tdap/Td series (2 - Td) 5/20/2025 Allergies as of 9/27/2018  Review Complete On: 8/7/2018 By: Carolina Alston MD  
 No Known Allergies Current Immunizations  Never Reviewed Name Date Influenza Vaccine 12/12/2012 Influenza Vaccine (Quad) PF  Incomplete, 9/22/2016 Tdap 5/20/2015 Not reviewed this visit You Were Diagnosed With   
  
 Codes Comments Encounter for immunization    -  Primary ICD-10-CM: J08 ICD-9-CM: V03.89 Vitals Temp Smoking Status 98.7 °F (37.1 °C) (Oral) Never Smoker Preferred Pharmacy Pharmacy Name Phone Kerrie Bennett, [facility] [phone] Your Updated Medication List  
  
   
This list is accurate as of 9/27/18  2:06 PM.  Always use your most recent med list.  
  
  
  
  
 chlorhexidine 0.12 % solution Commonly known as:  PERIDEX RINSE MOUTH WITH 15 ML FOR 30 SEC AND SPIT. USE EVERY 12 HOURS AFTER BRUSHING AND FLOSSING  
  
 cyclobenzaprine 10 mg tablet Commonly known as:  FLEXERIL Take 1 Tab by mouth three (3) times daily as needed for Muscle Spasm(s). desloratadine 5 mg tablet Commonly known as:  CLARINEX Take 1 Tab by mouth daily. ezetimibe-simvastatin 10-20 mg per tablet Commonly known as:  Vytorin 10/20 Take 1 Tab by mouth daily. ibuprofen 800 mg tablet Commonly known as:  MOTRIN Take 1 Tab by mouth every eight (8) hours as needed for Pain. lisinopril-hydroCHLOROthiazide 20-25 mg per tablet Commonly known as:  [place] Take 1 Tab by mouth daily. omeprazole 40 mg capsule Commonly known as:  PRILOSEC Take 1 Cap by mouth daily. tadalafil 5 mg tablet Commonly known as:  CIALIS Take 1 Tab by mouth daily. We Performed the Following INFLUENZA VIRUS VAC QUAD,SPLIT,PRESV FREE SYRINGE IM [id number] CPT(R)] [place] IMMUNIZ ADMIN,1 SINGLE/COMB VAC/TOXOID [id number] CPT(R)] Please provide this summary of care documentation to your next provider. Your primary care clinician is listed as Chantal Giron. If you have any questions after today's visit, please call [phone].

## 2018-09-27 NOTE — PROGRESS NOTES
Patient Leti Parham is a 51 y.o. male with history of HTN, GERD, HLD, Obesity    CC: flu shot    Subjective:  Here for flu shot. Denies recent or current illness. Would like flu Vaccination. Objective:  Visit Vitals    Temp 98.7 °F (37.1 °C) (Oral)     General: Patient is not distressed    Plan:  Encounter for Immunization: Medicare flu immunization. Risks and benefits of Immunization discussed.     Patient discussed with Dr. Nieves Solano, Attending Physician

## 2018-12-07 ENCOUNTER — OFFICE VISIT (OUTPATIENT)
Dept: FAMILY MEDICINE CLINIC | Age: 48
End: 2018-12-07

## 2018-12-07 VITALS
HEIGHT: 70 IN | RESPIRATION RATE: 16 BRPM | DIASTOLIC BLOOD PRESSURE: 81 MMHG | OXYGEN SATURATION: 96 % | SYSTOLIC BLOOD PRESSURE: 116 MMHG | BODY MASS INDEX: 41.09 KG/M2 | WEIGHT: 287 LBS | TEMPERATURE: 99 F | HEART RATE: 66 BPM

## 2018-12-07 DIAGNOSIS — G89.29 CHRONIC MIDLINE LOW BACK PAIN WITHOUT SCIATICA: ICD-10-CM

## 2018-12-07 DIAGNOSIS — N52.9 ERECTILE DYSFUNCTION, UNSPECIFIED ERECTILE DYSFUNCTION TYPE: ICD-10-CM

## 2018-12-07 DIAGNOSIS — Z88.9 H/O SEASONAL ALLERGIES: ICD-10-CM

## 2018-12-07 DIAGNOSIS — I10 ESSENTIAL HYPERTENSION WITH GOAL BLOOD PRESSURE LESS THAN 140/90: Primary | ICD-10-CM

## 2018-12-07 DIAGNOSIS — M54.50 CHRONIC MIDLINE LOW BACK PAIN WITHOUT SCIATICA: ICD-10-CM

## 2018-12-07 DIAGNOSIS — E78.2 MIXED HYPERLIPIDEMIA: ICD-10-CM

## 2018-12-07 DIAGNOSIS — I10 ESSENTIAL HYPERTENSION: ICD-10-CM

## 2018-12-07 DIAGNOSIS — K21.9 GASTROESOPHAGEAL REFLUX DISEASE WITHOUT ESOPHAGITIS: ICD-10-CM

## 2018-12-07 DIAGNOSIS — Z87.39 HISTORY OF HERNIATED INTERVERTEBRAL DISC: ICD-10-CM

## 2018-12-07 DIAGNOSIS — E66.01 OBESITY, MORBID (HCC): ICD-10-CM

## 2018-12-07 RX ORDER — OMEPRAZOLE 40 MG/1
40 CAPSULE, DELAYED RELEASE ORAL DAILY
Qty: 90 CAP | Refills: 3 | Status: SHIPPED | OUTPATIENT
Start: 2018-12-07 | End: 2019-12-06 | Stop reason: SDUPTHER

## 2018-12-07 RX ORDER — CHLORHEXIDINE GLUCONATE 1.2 MG/ML
RINSE ORAL
Qty: 473 ML | Refills: 5 | Status: SHIPPED | OUTPATIENT
Start: 2018-12-07 | End: 2019-12-06 | Stop reason: SDUPTHER

## 2018-12-07 RX ORDER — TADALAFIL 5 MG/1
5 TABLET ORAL DAILY
Qty: 90 TAB | Refills: 3 | Status: SHIPPED | OUTPATIENT
Start: 2018-12-07 | End: 2019-12-06 | Stop reason: SDUPTHER

## 2018-12-07 RX ORDER — EZETIMIBE AND SIMVASTATIN 10; 20 MG/1; MG/1
1 TABLET ORAL DAILY
Qty: 90 TAB | Refills: 3 | Status: SHIPPED | OUTPATIENT
Start: 2018-12-07 | End: 2019-08-08 | Stop reason: SDUPTHER

## 2018-12-07 RX ORDER — LISINOPRIL AND HYDROCHLOROTHIAZIDE 20; 25 MG/1; MG/1
1 TABLET ORAL DAILY
Qty: 90 TAB | Refills: 3 | Status: SHIPPED | OUTPATIENT
Start: 2018-12-07 | End: 2019-06-20 | Stop reason: ALTCHOICE

## 2018-12-07 RX ORDER — IBUPROFEN 800 MG/1
800 TABLET ORAL
Qty: 180 TAB | Refills: 3 | Status: SHIPPED | OUTPATIENT
Start: 2018-12-07 | End: 2019-12-06 | Stop reason: SDUPTHER

## 2018-12-07 RX ORDER — CYCLOBENZAPRINE HCL 10 MG
10 TABLET ORAL
Qty: 60 TAB | Refills: 2 | Status: SHIPPED | OUTPATIENT
Start: 2018-12-07 | End: 2019-12-06 | Stop reason: SDUPTHER

## 2018-12-07 RX ORDER — DESLORATADINE 5 MG/1
5 TABLET ORAL DAILY
Qty: 90 TAB | Refills: 3 | Status: SHIPPED | OUTPATIENT
Start: 2018-12-07 | End: 2019-08-08 | Stop reason: SDUPTHER

## 2018-12-07 NOTE — PROGRESS NOTES
[facility] Subjective:  
Angeles Head is a 51 y.o. male with history of HTN, Dyslipidemia, GERD, ED, Herniated Disc CC: Follow up Chronic Conditions History provided by patient and Records HPI: 
Hypertension Follow up: 
Currently Taking Lisinopril-HCTZ 20-25 mg. The patient reports:  taking medications as instructed, no medication side effects noted, patient does not perform home BP monitoring, no TIA's, no chest pain on exertion, no dyspnea on exertion, no swelling of ankles, no orthostatic dizziness or lightheadedness, no orthopnea or paroxysmal nocturnal dyspnea. Hypertriglyceridemia Follow up:  
Cardiovascular risks for him are: hypertension, hyperlipidemia, obese. Currently he takes Ezetimibe-Simvastatin , 10-20 mg 
 Myalgias: No 
 Fatigue: No 
 Other side effects: no 
  
GERD:Current control of Symptoms:good Hiatal Hernia:no Current Medications:omeprazole 40 mg daily The patient has no history melena or bright red blood in the stools. The patient avoids high dose aspirin and NSAID therapy. The patient is aware of diet changes needed, elevating the head of the bed and appropriate use of antacids. Erectile Dysfunction: Overall well controlled with Tadalafil. Chronic Back Pain/Herniated Disc: Has been mildly exacerbated with colld weather, taking Motrin and Flexeril with good pain control Obesity: Patient is interested in working on diet and weight loss. Would like to return after Holidays. Environmental Allergies: Well controlled PFSH:  
 
Current Outpatient Medications on File Prior to Visit Medication Sig Dispense Refill  chlorhexidine (PERIDEX) 0.12 % solution RINSE MOUTH WITH 15 ML FOR 30 SEC AND SPIT. USE EVERY 12 HOURS AFTER BRUSHING AND FLOSSING  5  
 omeprazole (PRILOSEC) 40 mg capsule Take 1 Cap by mouth daily. 90 Cap 3  
 tadalafil (CIALIS) 5 mg tablet Take 1 Tab by mouth daily.  90 Tab 3  
  lisinopril-hydroCHLOROthiazide (PRINZIDE, ZESTORETIC) 20-25 mg per tablet Take 1 Tab by mouth daily. 90 Tab 3  ibuprofen (MOTRIN) 800 mg tablet Take 1 Tab by mouth every eight (8) hours as needed for Pain. 180 Tab 3  
 ezetimibe-simvastatin (VYTORIN 10/20) 10-20 mg per tablet Take 1 Tab by mouth daily. 90 Tab 3  
 desloratadine (CLARINEX) 5 mg tablet Take 1 Tab by mouth daily. 90 Tab 3  cyclobenzaprine (FLEXERIL) 10 mg tablet Take 1 Tab by mouth three (3) times daily as needed for Muscle Spasm(s). 60 Tab 2 No current facility-administered medications on file prior to visit. Patient Active Problem List  
Diagnosis Code  History of herniated intervertebral disc Z87.39  
 Environmental allergies Z91.09  
 HTN (hypertension) I10  
 Hyperlipidemia E78.5  ED (erectile dysfunction) of organic origin N52.9  GERD (gastroesophageal reflux disease) K21.9  Obesity, morbid ([facility] [address] E66.01 Social History Socioeconomic History  Marital status:  Spouse name: Not on file  Number of children: Not on file  Years of education: Not on file  Highest education level: Not on file Social Needs  Financial resource strain: Not on file  Food insecurity - worry: Not on file  Food insecurity - inability: Not on file  Transportation needs - medical: Not on file  Transportation needs - non-medical: Not on file Occupational History  Not on file Tobacco Use  Smoking status: Never Smoker  Smokeless tobacco: Never Used Substance and Sexual Activity  Alcohol use: Yes Comment: social  
 Drug use: No  
 Sexual activity: Not on file Other Topics Concern  Not on file Social History Narrative  Not on file ROS Objective:  
 
Visit Vitals /81 (BP 1 Location: Left arm, BP Patient Position: Sitting) Pulse 66 Temp 99 °F (37.2 °C) (Oral) Resp 16 Ht 5' 10\" (1.778 m) Wt 287 lb (130.2 kg) SpO2 96% BMI 41.18 kg/m² Wt Readings from Last 3 Encounters:  
12/07/18 287 lb (130.2 kg) 08/07/18 282 lb (127.9 kg) 07/12/18 278 lb 3.2 oz (126.2 kg) BP Readings from Last 3 Encounters:  
08/07/18 106/76  
07/12/18 101/63  
06/12/18 105/71 Physical Exam  
Constitutional: He appears well-developed and well-nourished. HENT:  
Head: Normocephalic and atraumatic. Neck: Normal range of motion. Neck supple. Cardiovascular: Normal rate, regular rhythm, normal heart sounds and intact distal pulses. Exam reveals no gallop and no friction rub. No murmur heard. Pulmonary/Chest: Effort normal and breath sounds normal.  
Abdominal: Soft. Bowel sounds are normal. He exhibits no distension. There is no tenderness. Musculoskeletal: Normal range of motion. He exhibits no edema. Nursing note and vitals reviewed. Pertinent Labs/Studies: 
 
 
Assessment and orders: ICD-10-CM ICD-9-CM 1. Essential hypertension with goal blood pressure less than 140/90 I10 401.9 lisinopril-hydroCHLOROthiazide (PRINZIDE, ZESTORETIC) 20-25 mg per tablet CBC W/O DIFF  
   METABOLIC PANEL, BASIC 2. Essential hypertension I10 401.9 3. Mixed hyperlipidemia E78.2 272.2 ezetimibe-simvastatin (VYTORIN 10/20) 10-20 mg per tablet LIPID PANEL 4. Gastroesophageal reflux disease without esophagitis K21.9 530.81 omeprazole (PRILOSEC) 40 mg capsule 5. Obesity, morbid ([address] E66.01 278.01   
6. Chronic midline low back pain without sciatica M54.5 724.2 G89.29 338.29   
7. Erectile dysfunction, unspecified erectile dysfunction type N52.9 607.84 tadalafil (CIALIS) 5 mg tablet 8. H/O seasonal allergies Z88.9 V15.09 desloratadine (CLARINEX) 5 mg tablet 9. History of herniated intervertebral disc Z87.39 V13.59 cyclobenzaprine (FLEXERIL) 10 mg tablet  
   ibuprofen (MOTRIN) 800 mg tablet Diagnoses and all orders for this visit: 
 
7. Essential hypertension with goal blood pressure less than 140/90: Well controlled, Refill and labs -     lisinopril-hydroCHLOROthiazide (PRINZIDE, ZESTORETIC) 20-25 mg per tablet; Take 1 Tab by mouth daily.  
-     CBC W/O DIFF 
-     METABOLIC PANEL, BASIC 2. Mixed hyperlipidemia: Refill and labs 
-     ezetimibe-simvastatin (VYTORIN 10/20) 10-20 mg per tablet; Take 1 Tab by mouth daily. 
-     LIPID PANEL 3. Gastroesophageal reflux disease without esophagitis 
-     omeprazole (PRILOSEC) 40 mg capsule; Take 1 Cap by mouth daily. 4. Obesity, morbid ([address]): Will return to discuss diet in 2 months 5. Erectile dysfunction, unspecified erectile dysfunction type: Stable 
-     tadalafil (CIALIS) 5 mg tablet; Take 1 Tab by mouth daily. 6. H/O seasonal allergies: Stable 
-     desloratadine (CLARINEX) 5 mg tablet; Take 1 Tab by mouth daily. 7. History of herniated intervertebral disc: Mild exacerbation with weather. No changes to current regimen. 
-     ibuprofen (MOTRIN) 800 mg tablet; Take 1 Tab by mouth every eight (8) hours as needed for Pain. -     cyclobenzaprine (FLEXERIL) 10 mg tablet; Take 1 Tab by mouth three (3) times daily as needed for Muscle Spasm(s). Follow-up Disposition: 
Return in about 2 months (around 2/7/2019) for Weight Clinic. I have discussed the diagnosis with the patient and the intended plan as seen in the above orders. Social history, medical history, and labs were reviewed. The patient has received an after-visit summary and questions were answered concerning future plans. I have discussed medication side effects and warnings with the patient as well. Emma Jung MD 
Resident [facility] 12/07/18 Patient discussed with Dr. Jennifer Ariza, Attending Physician

## 2018-12-07 NOTE — PROGRESS NOTES
1. Have you been to the ER, urgent care clinic since your last visit? Hospitalized since your last visit? No 
 
2. Have you seen or consulted any other health care providers outside of the [address] [place] [address] since your last visit? Include any pap smears or colon screening. No 
Reviewed record in preparation for visit and have necessary documentation Pt did not bring medication to office visit for review Goals that were addressed and/or need to be completed during or after this appointment include There are no preventive care reminders to display for this patient.

## 2018-12-08 LAB
BUN SERPL-MCNC: 12 MG/DL (ref 6–24)
BUN/CREAT SERPL: 11 (ref 9–20)
CALCIUM SERPL-MCNC: 10.2 MG/DL (ref 8.7–10.2)
CHLORIDE SERPL-SCNC: 97 MMOL/L (ref 96–106)
CHOLEST SERPL-MCNC: 129 MG/DL (ref 100–199)
CO2 SERPL-SCNC: 23 MMOL/L (ref 20–29)
CREAT SERPL-MCNC: 1.07 MG/DL (ref 0.76–1.27)
ERYTHROCYTE [DISTWIDTH] IN BLOOD BY AUTOMATED COUNT: 13.5 % (ref 12.3–15.4)
GLUCOSE SERPL-MCNC: 108 MG/DL (ref 65–99)
HCT VFR BLD AUTO: 44.8 % (ref 37.5–51)
HDLC SERPL-MCNC: 33 MG/DL
HGB BLD-MCNC: 14.8 G/DL (ref 13–17.7)
LDLC SERPL CALC-MCNC: 67 MG/DL (ref 0–99)
MCH RBC QN AUTO: 29.1 PG (ref 26.6–33)
MCHC RBC AUTO-ENTMCNC: 33 G/DL (ref 31.5–35.7)
MCV RBC AUTO: 88 FL (ref 79–97)
PLATELET # BLD AUTO: 283 X10E3/UL (ref 150–379)
POTASSIUM SERPL-SCNC: 4.2 MMOL/L (ref 3.5–5.2)
RBC # BLD AUTO: 5.09 X10E6/UL (ref 4.14–5.8)
SODIUM SERPL-SCNC: 136 MMOL/L (ref 134–144)
TRIGL SERPL-MCNC: 143 MG/DL (ref 0–149)
VLDLC SERPL CALC-MCNC: 29 MG/DL (ref 5–40)
WBC # BLD AUTO: 5.4 X10E3/UL (ref 3.4–10.8)

## 2018-12-11 NOTE — PROGRESS NOTES
Overall unremarkable labs. Cholesterol levels stable, BMP is unremarkable, kidney function stable, Calcium level at the upper end of normal.  Asymptomatic at this time. Continue to monitor.

## 2019-02-01 ENCOUNTER — OFFICE VISIT (OUTPATIENT)
Dept: FAMILY MEDICINE CLINIC | Age: 49
End: 2019-02-01

## 2019-02-01 VITALS
RESPIRATION RATE: 20 BRPM | SYSTOLIC BLOOD PRESSURE: 108 MMHG | DIASTOLIC BLOOD PRESSURE: 70 MMHG | HEART RATE: 69 BPM | TEMPERATURE: 98.9 F | HEIGHT: 70 IN | OXYGEN SATURATION: 97 % | WEIGHT: 285 LBS | BODY MASS INDEX: 40.8 KG/M2

## 2019-02-01 DIAGNOSIS — I10 ESSENTIAL HYPERTENSION: Primary | ICD-10-CM

## 2019-02-01 DIAGNOSIS — Z00.00 HEALTHCARE MAINTENANCE: ICD-10-CM

## 2019-02-01 DIAGNOSIS — Z71.3 WEIGHT LOSS COUNSELING, ENCOUNTER FOR: ICD-10-CM

## 2019-02-01 DIAGNOSIS — E66.01 OBESITY, MORBID (HCC): ICD-10-CM

## 2019-02-01 DIAGNOSIS — R73.01 IMPAIRED FASTING GLUCOSE: ICD-10-CM

## 2019-02-01 RX ORDER — METFORMIN HYDROCHLORIDE 500 MG/1
500 TABLET, EXTENDED RELEASE ORAL
Qty: 30 TAB | Refills: 1 | Status: SHIPPED | OUTPATIENT
Start: 2019-02-01 | End: 2019-02-26 | Stop reason: SDUPTHER

## 2019-02-01 NOTE — PATIENT INSTRUCTIONS
Weight Loss Diet Guidelines Eat ONLY when you are hungry, and stop just before you are full. If you are eating enough fat in your meals, you will feel full for 5-6 hours after, and you can avoid snacks. This is your goal.  
 
Eat More of these Foods: 
 
Meat: Beef, lamb, pork, chicken and others Fish: Any kind of fish Eggs: As many as you want, with the yolk Vegetables: ANY vegetables but limit starchy vegetables like potatoes, corn, carrots or peas Nuts and Seeds: No more than one handful a day High-fat Dairy: Cheese, butter, heavy cream 
 
 
Eat Less of this, but OK Rarely: 
 
Fruits: Berries and Melon are best. May have one handful per day. Limit other fruits, particularly bananas, grapes, mangos, and pineapple DO NOT DRINK ANY JUICE, EVER. Whole grains: Oatmeal, quinoa, brown rice Legumes: Beans, lentils Do Not Eat these Foods: 
 
Drinks with calories: Sodas, fruit juices, sweet tea, sports drinks (gatorade, etc) Sugar: Honey, agave, candy, cake, cookies, ice cream 
Grains: Bread, pasta, crackers, cereal, chips Yogurt: Most yogurt is as bad as candy. Eat only high-fat, plain yogurt, like Fage 2% plain. Trans Fats: \"Hydrogenated\" or \"partially hydrogenated\" oils, margarine \"Diet\" and \"Low fat\" Products Highly processed foods. If it looks like it was made in a factory, don't eat it. If it has more than 5 ingredients, it is processed. What Should Meals Look Like? Breakfast: Gae Pummel, eggs, sausage or plain yogurt with berries Lunch: Big salad with meat, cheese, avocado, homemade dressing or olive oil and vinegar. Or meat, chicken, fish and vegetables. Dinner: Meat, chicken or fish and vegetables, or salad, like above Snack: Berries, cheese, nuts Drinks:Plenty of water. May also have coffee, tea, or artificially sweetened beverages (but not too many) Start a Calorie Counting Toñito, goal reduce average calorie intake by about 500 calories a day.

## 2019-02-01 NOTE — PROGRESS NOTES
1. Have you been to the ER, urgent care clinic since your last visit? Hospitalized since your last visit? No 
 
2. Have you seen or consulted any other health care providers outside of the [address] [place] [address] since your last visit? Include any pap smears or colon screening. No 
Reviewed record in preparation for visit and have necessary documentation Pt did not bring medication to office visit for review Goals that were addressed and/or need to be completed during or after this appointment include There are no preventive care reminders to display for this patient.

## 2019-02-01 NOTE — PROGRESS NOTES
[facility] Subjective:  
Bernardo Sanchez is a 51 y.o. male with history of HTN, Obesity, HLD, impaired fasting glucose CC: HTN, Weight loss counseling, initial visit. History provided by patient and Records HPI: 
 
Weight Loss Clinic - BMI today: Body mass index is 40.89 kg/m². - Weight today: 285 lb - Comorbid conditions: HTN, Impaired fasting glucose - Goals for weight loss: Ideal weight loss is about 30 lb. Weight at 26years old is about 250Lb. - Therapies Considered: Diet/Exercise and medication, not interested in surgery Diet history:  
Breakfast: Often misses breakfast, sometimes fast food Lunch: Regularly Fast food Dinner: Mixed diet, home meals Snacks: Minimal 
Drinks: Diet sodas, water, alcohol-variable amounts but not binge drinking or heavy drinker. Cravings: Denies cravings of sweets Dietary preferences/cultural restrictions: None Have you ever tried any diets/structured weight loss programs?: Atkins and \"[employer] Challenge\" attempted, significant weight loss but not maintained Has patient ever been on medications for weight loss? If so, which ones, how long, and how effective were they?: No 
 
Has patient ever had surgery for weight loss?: No 
 
Does patient exercise regularly?: No 
 
Hypertension Follow up: 
Currently Taking Lisinopril-HCTZ 20-25 mg. The patient reports:  taking medications as instructed, no medication side effects noted, home BP monitoring in range of 834-254'P systolic over 50-41'J diastolic, no TIA's, no chest pain on exertion, no dyspnea on exertion, no swelling of ankles. BP Readings from Last 3 Encounters:  
02/01/19 108/70  
12/07/18 116/81  
08/07/18 106/76 [facility]:  
 
Current Outpatient Medications on File Prior to Visit Medication Sig Dispense Refill  cyclobenzaprine (FLEXERIL) 10 mg tablet Take 1 Tab by mouth three (3) times daily as needed for Muscle Spasm(s).  60 Tab 2  
  tadalafil (CIALIS) 5 mg tablet Take 1 Tab by mouth daily. 90 Tab 3  
 lisinopril-hydroCHLOROthiazide (PRINZIDE, ZESTORETIC) 20-25 mg per tablet Take 1 Tab by mouth daily. 90 Tab 3  
 omeprazole (PRILOSEC) 40 mg capsule Take 1 Cap by mouth daily. 90 Cap 3  
 desloratadine (CLARINEX) 5 mg tablet Take 1 Tab by mouth daily. 90 Tab 3  ibuprofen (MOTRIN) 800 mg tablet Take 1 Tab by mouth every eight (8) hours as needed for Pain. 180 Tab 3  
 ezetimibe-simvastatin (VYTORIN 10/20) 10-20 mg per tablet Take 1 Tab by mouth daily. 90 Tab 3  chlorhexidine (PERIDEX) 0.12 % solution RINSE MOUTH WITH 15 ML FOR 30 SEC AND SPIT. USE EVERY 12 HOURS AFTER BRUSHING AND FLOSSING 473 mL 5 No current facility-administered medications on file prior to visit. Patient Active Problem List  
Diagnosis Code  History of herniated intervertebral disc Z87.39  
 Environmental allergies Z91.09  
 HTN (hypertension) I10  
 Hyperlipidemia E78.5  ED (erectile dysfunction) of organic origin N52.9  GERD (gastroesophageal reflux disease) K21.9  Obesity, morbid ([facility] [address] E66.01 Social History Socioeconomic History  Marital status:  Spouse name: Not on file  Number of children: Not on file  Years of education: Not on file  Highest education level: Not on file Social Needs  Financial resource strain: Not very hard  Food insecurity - worry: Never true  Food insecurity - inability: Never true  Transportation needs - medical: No  
 Transportation needs - non-medical: No  
Occupational History  Not on file Tobacco Use  Smoking status: Never Smoker  Smokeless tobacco: Never Used Substance and Sexual Activity  Alcohol use: Yes Comment: social  
 Drug use: No  
 Sexual activity: Not on file Other Topics Concern [address] Service Not Asked  Blood Transfusions Not Asked  Caffeine Concern Not Asked  Occupational Exposure Not Asked [employer] Hazards Not Asked  Sleep Concern Not Asked  Stress Concern Not Asked  Weight Concern Yes  Special Diet Not Asked  Back Care Not Asked  Exercise Not Asked  Bike Helmet Not Asked  Seat Belt Not Asked  Self-Exams Not Asked Social History Narrative  Not on file Review of Systems Constitutional: Negative for malaise/fatigue and weight loss. Cardiovascular: Negative for chest pain, palpitations and leg swelling. Gastrointestinal: Negative for abdominal pain. Objective:  
 
Visit Vitals /70 (BP 1 Location: Left arm, BP Patient Position: Sitting) Pulse 69 Temp 98.9 °F (37.2 °C) (Oral) Resp 20 Ht 5' 10\" (1.778 m) Wt 285 lb (129.3 kg) SpO2 97% BMI 40.89 kg/m² Physical Exam  
Constitutional: He is oriented to person, place, and time. He appears well-developed and well-nourished. No distress. HENT:  
Head: Normocephalic and atraumatic. Neck: Normal range of motion. Neck supple. Cardiovascular: Normal rate, regular rhythm, normal heart sounds and intact distal pulses. Exam reveals no gallop and no friction rub. No murmur heard. Pulmonary/Chest: Effort normal and breath sounds normal.  
Abdominal: Soft. Bowel sounds are normal. He exhibits no distension. There is no tenderness. Musculoskeletal: Normal range of motion. He exhibits no edema. Neurological: He is alert and oriented to person, place, and time. Psychiatric: He has a normal mood and affect. His behavior is normal. Judgment and thought content normal.  
Nursing note and vitals reviewed. Pertinent Labs/Studies: 
Pertinent labs: No results found for: HBA1C, HGBE8, [id number], [id number] Lab Results Component Value Date/Time  Sodium 136 12/07/2018 12:21 PM  
 Potassium 4.2 12/07/2018 12:21 PM  
 Chloride 97 12/07/2018 12:21 PM  
 CO2 23 12/07/2018 12:21 PM  
 Anion gap 8 09/22/2010 09:09 AM  
 Glucose 108 (H) 12/07/2018 12:21 PM  
 BUN 12 12/07/2018 12:21 PM  
 Creatinine 1.07 12/07/2018 12:21 PM  
 BUN/Creatinine ratio 11 12/07/2018 12:21 PM  
 GFR est AA 94 12/07/2018 12:21 PM  
 GFR est non-AA 81 12/07/2018 12:21 PM  
 Calcium 10.2 12/07/2018 12:21 PM  
 Bilirubin, total 0.9 05/18/2018 12:25 PM  
 AST (SGOT) 31 05/18/2018 12:25 PM  
 Alk. phosphatase 38 (L) 05/18/2018 12:25 PM  
 Protein, total 7.6 05/18/2018 12:25 PM  
 Albumin 4.6 05/18/2018 12:25 PM  
 Globulin 3.9 09/22/2010 09:09 AM  
 A-G Ratio 1.5 05/18/2018 12:25 PM  
 ALT (SGPT) 28 05/18/2018 12:25 PM  
 
Lab Results Component Value Date/Time Cholesterol, total 129 12/07/2018 12:21 PM  
 HDL Cholesterol 33 (L) 12/07/2018 12:21 PM  
 LDL, calculated 67 12/07/2018 12:21 PM  
 VLDL, calculated 29 12/07/2018 12:21 PM  
 Triglyceride 143 12/07/2018 12:21 PM  
 CHOL/HDL Ratio 5.0 09/22/2010 09:09 AM  
 
Lab Results Component Value Date/Time TSH 0.710 10/06/2015 09:55 AM  
 
 
 
Assessment and orders: ICD-10-CM ICD-9-CM 1. Essential hypertension I10 401.9 2. Impaired fasting glucose R73.01 790.21 metFORMIN ER (GLUCOPHAGE XR) 500 mg tablet HEMOGLOBIN A1C WITH EAG 3. Weight loss counseling, encounter for Z71.3 V65.3 4. Healthcare maintenance Z00.00 V70.0 REFERRAL TO GASTROENTEROLOGY Diagnoses and all orders for this visit: 1. Essential hypertension: Well controlled, No changes to current regimen. If weight los effective may consider stopping antihypertensives. 2. Weight loss counseling, encounter for/Impaired fasting glucose: Initial visit for weight loss clinic. Diet history taken today and recommendations made as noted in Patient Instructions. Medications discussed today and decision made to start Metformin. Not interested in weight loss surgery. RTC in 1 month for obesity follow-up  I have recommended the following interventions: dietary management education, guidance, and counseling, monitor weight and prescribed dietary intake. -     metFORMIN ER (GLUCOPHAGE XR) 500 mg tablet; Take 1 Tab by mouth daily (with dinner). -     HEMOGLOBIN A1C WITH EAG 3. Healthcare maintenance: Patient turns 49 in about a month, will get setup with GI to setup colonoscopy. -     REFERRAL TO GASTROENTEROLOGY Follow-up Disposition: 
Return in about 4 weeks (around 3/1/2019) for Weight clinic. I have discussed the diagnosis with the patient and the intended plan as seen in the above orders. Social history, medical history, and labs were reviewed. The patient has received an after-visit summary and questions were answered concerning future plans. I have discussed medication side effects and warnings with the patient as well. Malgorzata Brown MD 
Resident [facility] 02/01/19 Patient discussed with Dr. Larissa Reeves, Attending Physician

## 2019-02-02 LAB
EST. AVERAGE GLUCOSE BLD GHB EST-MCNC: 126 MG/DL
HBA1C MFR BLD: 6 % (ref 4.8–5.6)

## 2019-02-26 ENCOUNTER — OFFICE VISIT (OUTPATIENT)
Dept: FAMILY MEDICINE CLINIC | Age: 49
End: 2019-02-26

## 2019-02-26 VITALS
OXYGEN SATURATION: 98 % | BODY MASS INDEX: 39.22 KG/M2 | HEART RATE: 62 BPM | WEIGHT: 274 LBS | RESPIRATION RATE: 18 BRPM | DIASTOLIC BLOOD PRESSURE: 75 MMHG | HEIGHT: 70 IN | SYSTOLIC BLOOD PRESSURE: 116 MMHG | TEMPERATURE: 97.4 F

## 2019-02-26 DIAGNOSIS — E66.09 CLASS 2 OBESITY DUE TO EXCESS CALORIES WITHOUT SERIOUS COMORBIDITY WITH BODY MASS INDEX (BMI) OF 39.0 TO 39.9 IN ADULT: Primary | ICD-10-CM

## 2019-02-26 DIAGNOSIS — R73.01 IMPAIRED FASTING GLUCOSE: ICD-10-CM

## 2019-02-26 DIAGNOSIS — Z71.3 WEIGHT LOSS COUNSELING, ENCOUNTER FOR: ICD-10-CM

## 2019-02-26 RX ORDER — METFORMIN HYDROCHLORIDE 500 MG/1
500 TABLET, EXTENDED RELEASE ORAL
Qty: 90 TAB | Refills: 1 | Status: SHIPPED | OUTPATIENT
Start: 2019-02-26 | End: 2019-06-06 | Stop reason: SDUPTHER

## 2019-02-26 NOTE — PROGRESS NOTES
1. Have you been to the ER, urgent care clinic since your last visit? Hospitalized since your last visit? No 
 
2. Have you seen or consulted any other health care providers outside of the [address] [place] [address] since your last visit? Include any pap smears or colon screening. No 
Reviewed record in preparation for visit and have necessary documentation Pt did not bring medication to office visit for review Information was given to pt on Advanced Directives, Living Will Information was given on Shingles Vaccine 
opportunity was given for questions Goals that were addressed and/or need to be completed during or after this appointment include There are no preventive care reminders to display for this patient.

## 2019-02-26 NOTE — PROGRESS NOTES
[facility] Follow-up Visit Date: 02/26/19 CC: Pt is a 51 y.o. male who presents for weight loss counseling, follow-up visit. BMI today: Body mass index is 39.31 kg/m². Weight today: 274 Weight change from last visit: 11 lbs Comorbid conditions: HLD, HTN, ED Goals for weight loss: reach 250 lbs Progression towards goals: Doing well HPI:  
 
Current diet: Mixed Breakfast: Occasional eating eggs and ham 
Lunch: Fast food, more vegetables and avoid carbs Dinner: Mixed diet, home meals Snacks: Occasional fruits Drinks: Water, stopped most diet drinks Compliance with medications: Doing well with Metformin PE: 
Visit Vitals /75 (BP 1 Location: Left arm, BP Patient Position: Sitting) Pulse 62 Temp 97.4 °F (36.3 °C) (Oral) Resp 18 Ht 5' 10\" (1.778 m) Wt 274 lb (124.3 kg) SpO2 98% BMI 39.31 kg/m² Gen: Pt sitting in chair, in NAD Head: Normocephalic, atraumatic Eyes: Sclera anicteric, EOM grossly intact, PERRL Throat: MMM Neck: Supple CVS: Normal S1, S2, no m/r/g Resp: CTAB, no wheezes or rales Extrem: Atraumatic, no cyanosis or edema Pulses: 2+ Skin: Warm, dry Neuro: Alert, oriented, appropriate A/P: Pt is a 51 y.o. male who presents for weight loss follow-up. Progressing well, continuing Metformin and current diet. 
- RTC in 1 month for weight loss follow-up I have reviewed/discussed the above normal BMI with the patient. I have recommended the following interventions: dietary management education, guidance, and counseling, monitor weight and prescribed dietary intake . Florentino Ruibo MD 
Resident [facility] 02/27/19 Patient discussed with Dr. Fantasma Davies, Attending Physician

## 2019-06-06 ENCOUNTER — OFFICE VISIT (OUTPATIENT)
Dept: FAMILY MEDICINE CLINIC | Age: 49
End: 2019-06-06

## 2019-06-06 VITALS
TEMPERATURE: 98.4 F | HEIGHT: 70 IN | HEART RATE: 64 BPM | OXYGEN SATURATION: 95 % | SYSTOLIC BLOOD PRESSURE: 108 MMHG | BODY MASS INDEX: 38.28 KG/M2 | WEIGHT: 267.4 LBS | DIASTOLIC BLOOD PRESSURE: 73 MMHG | RESPIRATION RATE: 20 BRPM

## 2019-06-06 DIAGNOSIS — I10 ESSENTIAL HYPERTENSION: Primary | ICD-10-CM

## 2019-06-06 DIAGNOSIS — Z71.3 WEIGHT LOSS COUNSELING, ENCOUNTER FOR: ICD-10-CM

## 2019-06-06 DIAGNOSIS — E66.01 OBESITY, MORBID (HCC): ICD-10-CM

## 2019-06-06 DIAGNOSIS — E78.2 MIXED HYPERLIPIDEMIA: ICD-10-CM

## 2019-06-06 DIAGNOSIS — R73.01 IMPAIRED FASTING GLUCOSE: ICD-10-CM

## 2019-06-06 DIAGNOSIS — I10 ESSENTIAL HYPERTENSION: ICD-10-CM

## 2019-06-06 RX ORDER — METFORMIN HYDROCHLORIDE 500 MG/1
500 TABLET, EXTENDED RELEASE ORAL
Qty: 90 TAB | Refills: 1 | Status: SHIPPED | OUTPATIENT
Start: 2019-06-06 | End: 2019-08-08 | Stop reason: SDUPTHER

## 2019-06-06 NOTE — PROGRESS NOTES
[facility]    Subjective:   Eris Milligan is a 47 y.o. male with history of HLD, HTN, Prediabetic, ED  CC: Follow up Chronic Conditions, weight Loss  History provided by patient and Records    HPI:  Hypertension Follow up:  Currently Taking Lisinopril 20-25 mg. The patient reports: Taking medications as instructed, no medication side effects noted, no TIA's, no chest pain on exertion, no dyspnea on exertion, no swelling of ankles. BP Readings from Last 3 Encounters:   06/06/19 108/73   02/26/19 116/75   02/01/19 108/70     Hypertriglyceridemia Follow up:   Cardiovascular risks for him are: LDL goal is under 80, hypertension, hyperlipidemia. Currently he takes Vytorin, 10-20 mg   Myalgias: No   Fatigue: No   Other side effects: no     Wt Readings from Last 3 Encounters:   06/06/19 267 lb 6.4 oz (121.3 kg)   02/26/19 274 lb (124.3 kg)   02/01/19 285 lb (129.3 kg)     Erectile Dysfunction:  Doing well with Cialis, 5-10 mg. No side effects    GERD:Current control of Symptoms:good  Hiatal Hernia:no  Current Medications:omeprazole  The patient has no history melena or bright red blood in the stools. The patient avoids high dose aspirin and NSAID therapy. The patient is aware of diet changes needed, elevating the head of the bed and appropriate use of antacids. Weight Loss Clinic:  Has lost 7 pounds since last visit. Primary Difference, decreased Soda intake, primarily water, occasional beer. Avoid breads, proteins and vegetables primarily. Favorite Protein is Hotdogs. PFSH:     Current Outpatient Medications on File Prior to Visit   Medication Sig Dispense Refill    cyclobenzaprine (FLEXERIL) 10 mg tablet Take 1 Tab by mouth three (3) times daily as needed for Muscle Spasm(s). 60 Tab 2    tadalafil (CIALIS) 5 mg tablet Take 1 Tab by mouth daily. 90 Tab 3    lisinopril-hydroCHLOROthiazide (PRINZIDE, ZESTORETIC) 20-25 mg per tablet Take 1 Tab by mouth daily.  90 Tab 3    omeprazole (PRILOSEC) 40 mg capsule Take 1 Cap by mouth daily. 90 Cap 3    desloratadine (CLARINEX) 5 mg tablet Take 1 Tab by mouth daily. 90 Tab 3    ibuprofen (MOTRIN) 800 mg tablet Take 1 Tab by mouth every eight (8) hours as needed for Pain. 180 Tab 3    ezetimibe-simvastatin (VYTORIN 10/20) 10-20 mg per tablet Take 1 Tab by mouth daily. 90 Tab 3    chlorhexidine (PERIDEX) 0.12 % solution RINSE MOUTH WITH 15 ML FOR 30 SEC AND SPIT. USE EVERY 12 HOURS AFTER BRUSHING AND FLOSSING 473 mL 5     No current facility-administered medications on file prior to visit. Patient Active Problem List   Diagnosis Code    History of herniated intervertebral disc Z87.39    Environmental allergies Z91.09    HTN (hypertension) I10    Hyperlipidemia E78.5    ED (erectile dysfunction) of organic origin N52.9    GERD (gastroesophageal reflux disease) K21.9    Obesity, morbid ([facility] [address] E66.01       Social History     Socioeconomic History    Marital status:      Spouse name: Not on file    Number of children: Not on file    Years of education: Not on file    Highest education level: Not on file   Occupational History    Not on file   Social Needs    Financial resource strain: Not very hard    Food insecurity:     Worry: Never true     Inability: Never true    Transportation needs:     Medical: No     Non-medical: No   Tobacco Use    Smoking status: Never Smoker    Smokeless tobacco: Never Used   Substance and Sexual Activity    Alcohol use: Yes     Comment: social    Drug use: No    Sexual activity: Not on file   Lifestyle    Physical activity:     Days per week: 0 days     Minutes per session: 0 min    Stress:  Only a little   Relationships    Social connections:     Talks on phone: Not on file     Gets together: Not on file     Attends [employer] service: Not on file     Active member of club or organization: Not on file     Attends meetings of clubs or organizations: Not on file     Relationship status: Not on file    Intimate partner violence:     Fear of current or ex partner: Not on file     Emotionally abused: Not on file     Physically abused: Not on file     Forced sexual activity: Not on file   Other Topics Concern     Service Not Asked    Blood Transfusions Not Asked    Caffeine Concern Not Asked    Occupational Exposure Not Asked    Hobby Hazards Not Asked    Sleep Concern Not Asked    Stress Concern Not Asked    Weight Concern Yes    Special Diet Not Asked    Back Care Not Asked    Exercise Not Asked    Bike Helmet Not Asked   [address] Not Asked    Self-Exams Not Asked   Social History Narrative    Not on file       Review of Systems   Constitutional: Negative for chills, fever and malaise/fatigue. Cardiovascular: Negative for chest pain and palpitations. Gastrointestinal: Negative for abdominal pain, heartburn, nausea and vomiting. Objective:     Visit Vitals  /73 (BP 1 Location: Right arm, BP Patient Position: Sitting)   Pulse 64   Temp 98.4 °F (36.9 °C) (Oral)   Resp 20   Ht 5' 10\" (1.778 m)   Wt 267 lb 6.4 oz (121.3 kg)   SpO2 95%   BMI 38.37 kg/m²          Physical Exam   Constitutional: He is oriented to person, place, and time. He appears well-developed and well-nourished. No distress. HENT:   Head: Normocephalic and atraumatic. Neck: Normal range of motion. Neck supple. Cardiovascular: Normal rate, regular rhythm and normal heart sounds. Exam reveals no gallop and no friction rub. No murmur heard. Pulmonary/Chest: Effort normal and breath sounds normal.   Abdominal: Soft. Bowel sounds are normal.   Musculoskeletal: He exhibits no edema. Neurological: He is alert and oriented to person, place, and time. Skin: Skin is warm and dry. Psychiatric: He has a normal mood and affect. His behavior is normal.   Nursing note and vitals reviewed.     Pertinent Labs/Studies:  Lab Results   Component Value Date/Time    Sodium 136 12/07/2018 12:21 PM    Potassium 4.2 12/07/2018 12:21 PM    Chloride 97 12/07/2018 12:21 PM    CO2 23 12/07/2018 12:21 PM    Anion gap 8 09/22/2010 09:09 AM    Glucose 108 (H) 12/07/2018 12:21 PM    BUN 12 12/07/2018 12:21 PM    Creatinine 1.07 12/07/2018 12:21 PM    BUN/Creatinine ratio 11 12/07/2018 12:21 PM    GFR est AA 94 12/07/2018 12:21 PM    GFR est non-AA 81 12/07/2018 12:21 PM    Calcium 10.2 12/07/2018 12:21 PM    Bilirubin, total 0.9 05/18/2018 12:25 PM    AST (SGOT) 31 05/18/2018 12:25 PM    Alk. phosphatase 38 (L) 05/18/2018 12:25 PM    Protein, total 7.6 05/18/2018 12:25 PM    Albumin 4.6 05/18/2018 12:25 PM    Globulin 3.9 09/22/2010 09:09 AM    A-G Ratio 1.5 05/18/2018 12:25 PM    ALT (SGPT) 28 05/18/2018 12:25 PM       Lab Results   Component Value Date/Time    Hemoglobin A1c 6.0 (H) 02/01/2019 10:44 AM       Lab Results   Component Value Date/Time    Cholesterol, total 129 12/07/2018 12:21 PM    HDL Cholesterol 33 (L) 12/07/2018 12:21 PM    LDL, calculated 67 12/07/2018 12:21 PM    Triglyceride 143 12/07/2018 12:21 PM    CHOL/HDL Ratio 5.0 09/22/2010 09:09 AM       Lab Results   Component Value Date/Time    WBC 5.4 12/07/2018 12:21 PM    HGB 14.8 12/07/2018 12:21 PM    HCT 44.8 12/07/2018 12:21 PM    PLATELET 828 79/93/3585 12:21 PM    MCV 88 12/07/2018 12:21 PM         Assessment and orders:       ICD-10-CM ICD-9-CM    1. Essential hypertension W25 297.2 METABOLIC PANEL, BASIC      CBC W/O DIFF   2. Impaired fasting glucose R73.01 790.21 metFORMIN ER (GLUCOPHAGE XR) 500 mg tablet   3. Mixed hyperlipidemia E78.2 272.2 LIPID PANEL   4. Obesity, morbid ([address] E66.01 278.01    5. Weight loss counseling, encounter for Z71.3 V65.3      Diagnoses and all orders for this visit:    1. Essential hypertension: Blood pressure extremely well controlled, will cut Prinzide in half today and monitor for now, labs today. Our goal is to normalize the blood pressure to decrease the risks of strokes and heart attacks.  The patient is in agreement with the plan. -     METABOLIC PANEL, BASIC  -     CBC W/O DIFF; Future    2. Impaired fasting glucose: Tolerating well, no side effects  -     metFORMIN ER (GLUCOPHAGE XR) 500 mg tablet; Take 1 Tab by mouth daily (with dinner). 3. Mixed hyperlipidemia: Continue Vytorin. The patient is aware of our goal to reduce or eliminate the long term problems (such as strokes and heart attacks) related to poorly controlled Triglycerides, LDL, Cholesterol.  -     LIPID PANEL    4. Obesity, morbid (HCC)/Weight loss counseling, encounter for: Continue current diet and exercise. Doing well.  7 Lb weight loss. Follow-up and Dispositions    · Return in about 3 months (around 9/6/2019) for Weight Clinic. I have discussed the diagnosis with the patient and the intended plan as seen in the above orders. Social history, medical history, and labs were reviewed. The patient has received an after-visit summary and questions were answered concerning future plans. I have discussed medication side effects and warnings with the patient as well.     Herminio Brambila MD  Resident [facility]  06/06/19    Patient discussed with Dr. Roseline Quezada, Attending Physician

## 2019-06-06 NOTE — PATIENT INSTRUCTIONS
Cut your Prinzide in half and check your blood pressures. Our Goal is to keep your BP below 130 on the top number and below 85 on the bottom number.

## 2019-06-06 NOTE — PROGRESS NOTES
1. Have you been to the ER, urgent care clinic since your last visit? Hospitalized since your last visit? No    2. Have you seen or consulted any other health care providers outside of the [address] [place] [address] since your last visit? Include any pap smears or colon screening.  No  Reviewed record in preparation for visit and have necessary documentation  Pt did not bring medication to office visit for review  Information was given to pt on Advanced Directives, Living Will  Information was given on Shingles Vaccine  opportunity was given for questions  Goals that were addressed and/or need to be completed during or after this appointment include     Health Maintenance Due   Topic Date Due    Shingrix Vaccine Age 50> (1 of 2) 03/19/2019    FOBT Q 1 YEAR AGE 50-75  03/19/2019

## 2019-06-06 NOTE — PROGRESS NOTES
I discussed the findings, assessment and plan in detail with the resident and agree with the resident's findings and plan as documented in the resident's note. Losing weight. Keith Langford M.D.

## 2019-06-07 LAB
BUN SERPL-MCNC: 13 MG/DL (ref 6–24)
BUN/CREAT SERPL: 12 (ref 9–20)
CALCIUM SERPL-MCNC: 10.9 MG/DL (ref 8.7–10.2)
CHLORIDE SERPL-SCNC: 98 MMOL/L (ref 96–106)
CHOLEST SERPL-MCNC: 127 MG/DL (ref 100–199)
CO2 SERPL-SCNC: 21 MMOL/L (ref 20–29)
CREAT SERPL-MCNC: 1.07 MG/DL (ref 0.76–1.27)
ERYTHROCYTE [DISTWIDTH] IN BLOOD BY AUTOMATED COUNT: 13.8 % (ref 12.3–15.4)
GLUCOSE SERPL-MCNC: 99 MG/DL (ref 65–99)
HCT VFR BLD AUTO: 41.9 % (ref 37.5–51)
HDLC SERPL-MCNC: 34 MG/DL
HGB BLD-MCNC: 14.7 G/DL (ref 13–17.7)
LDLC SERPL CALC-MCNC: 72 MG/DL (ref 0–99)
MCH RBC QN AUTO: 30.1 PG (ref 26.6–33)
MCHC RBC AUTO-ENTMCNC: 35.1 G/DL (ref 31.5–35.7)
MCV RBC AUTO: 86 FL (ref 79–97)
PLATELET # BLD AUTO: 290 X10E3/UL (ref 150–450)
POTASSIUM SERPL-SCNC: 4.6 MMOL/L (ref 3.5–5.2)
RBC # BLD AUTO: 4.88 X10E6/UL (ref 4.14–5.8)
SODIUM SERPL-SCNC: 137 MMOL/L (ref 134–144)
TRIGL SERPL-MCNC: 105 MG/DL (ref 0–149)
VLDLC SERPL CALC-MCNC: 21 MG/DL (ref 5–40)
WBC # BLD AUTO: 4.7 X10E3/UL (ref 3.4–10.8)

## 2019-06-10 PROBLEM — E83.52 SERUM CALCIUM ELEVATED: Status: ACTIVE | Noted: 2019-06-10

## 2019-06-10 NOTE — PROGRESS NOTES
CBC unremarkable, noting elevated calcium level, has been either elevated or borderline high. No Parathyroid testing previously.   ASCVD risk is low (<3.9%)

## 2019-06-13 DIAGNOSIS — E83.52 SERUM CALCIUM ELEVATED: Primary | ICD-10-CM

## 2019-06-14 LAB — PTH-INTACT SERPL-MCNC: 40 PG/ML (ref 15–65)

## 2019-06-19 NOTE — PROGRESS NOTES
Elevated Calcium with a normal PTH, Hyperparathyroidism. Asymptomatic but will discuss further lab evaluation and imaging.

## 2019-06-20 ENCOUNTER — TELEPHONE (OUTPATIENT)
Dept: FAMILY MEDICINE CLINIC | Age: 49
End: 2019-06-20

## 2019-06-20 DIAGNOSIS — I10 ESSENTIAL HYPERTENSION: Primary | ICD-10-CM

## 2019-06-20 RX ORDER — LISINOPRIL 20 MG/1
20 TABLET ORAL DAILY
Qty: 90 TAB | Refills: 3 | Status: SHIPPED | OUTPATIENT
Start: 2019-06-20 | End: 2019-12-06 | Stop reason: SDUPTHER

## 2019-06-20 NOTE — TELEPHONE ENCOUNTER
Called patient to discuss Elevated Calcium with a normal PTH level, sign of hyperparathyroidism. Stopping HCTZ at this time and continuing Lisinopril 20 mg. Will need follow up for BP monitoring in the next month, but BP had derrick improving significantly with weight loss program so previously planned to decrease BP regimen anyways. Also on follow up will need to get BMP, and consider a Phos and vitamin D level as well. Also to consider urine calcium studies, but needs to be off of HCTZ before getting studies. Eduardo is asymptomatic at this time.     Edwin Nichols MD  Resident [facility]  06/20/19

## 2019-06-25 ENCOUNTER — OFFICE VISIT (OUTPATIENT)
Dept: FAMILY MEDICINE CLINIC | Age: 49
End: 2019-06-25

## 2019-06-25 VITALS
BODY MASS INDEX: 38.65 KG/M2 | OXYGEN SATURATION: 95 % | RESPIRATION RATE: 16 BRPM | TEMPERATURE: 97 F | HEIGHT: 70 IN | HEART RATE: 66 BPM | SYSTOLIC BLOOD PRESSURE: 107 MMHG | DIASTOLIC BLOOD PRESSURE: 73 MMHG | WEIGHT: 270 LBS

## 2019-06-25 DIAGNOSIS — I10 ESSENTIAL HYPERTENSION: Primary | ICD-10-CM

## 2019-06-25 DIAGNOSIS — E83.52 SERUM CALCIUM ELEVATED: ICD-10-CM

## 2019-06-25 NOTE — PROGRESS NOTES
1. Have you been to the ER, urgent care clinic since your last visit? Hospitalized since your last visit? no    2. Have you seen or consulted any other health care providers outside of the [address] [place] [address] since your last visit? Include any pap smears or colon screening. no  Reviewed record in preparation for visit and have obtained necessary documentation. Patient did not bring medications to visit for review. Information provided on Advanced Directive, Living Will. Body mass index is 38.74 kg/m².    Health Maintenance Due   Topic Date Due    Shingrix Vaccine Age 50> (1 of 2) 03/19/2019    FOBT Q 1 YEAR AGE 50-75  03/19/2019

## 2019-06-25 NOTE — PATIENT INSTRUCTIONS
A Healthy Lifestyle: Care Instructions Your Care Instructions A healthy lifestyle can help you feel good, stay at a healthy weight, and have plenty of energy for both work and play. A healthy lifestyle is something you can share with your whole family. A healthy lifestyle also can lower your risk for serious health problems, such as high blood pressure, heart disease, and diabetes. You can follow a few steps listed below to improve your health and the health of your family. Follow-up care is a key part of your treatment and safety. Be sure to make and go to all appointments, and call your doctor if you are having problems. It's also a good idea to know your test results and keep a list of the medicines you take. How can you care for yourself at home? · Do not eat too much sugar, fat, or fast foods. You can still have dessert and treats now and then. The goal is moderation. · Start small to improve your eating habits. Pay attention to portion sizes, drink less juice and soda pop, and eat more fruits and vegetables. ? Eat a healthy amount of food. A 3-ounce serving of meat, for example, is about the size of a deck of cards. Fill the rest of your plate with vegetables and whole grains. ? Limit the amount of soda and sports drinks you have every day. Drink more water when you are thirsty. ? Eat at least 5 servings of fruits and vegetables every day. It may seem like a lot, but it is not hard to reach this goal. A serving or helping is 1 piece of fruit, 1 cup of vegetables, or 2 cups of leafy, raw vegetables. Have an apple or some carrot sticks as an afternoon snack instead of a candy bar. Try to have fruits and/or vegetables at every meal. 
· Make exercise part of your daily routine. You may want to start with simple activities, such as walking, bicycling, or slow swimming. Try to be active 30 to 60 minutes every day.  You do not need to do all 30 to 60 minutes all at once. For example, you can exercise 3 times a day for 10 or 20 minutes. Moderate exercise is safe for most people, but it is always a good idea to talk to your doctor before starting an exercise program. 
· Keep moving. Guille Lopez the lawn, work in the garden, or [place]. Take the stairs instead of the elevator at work. · If you smoke, quit. People who smoke have an increased risk for heart attack, stroke, cancer, and other lung illnesses. Quitting is hard, but there are ways to boost your chance of quitting tobacco for good. ? Use nicotine gum, patches, or lozenges. ? Ask your doctor about stop-smoking programs and medicines. ? Keep trying. In addition to reducing your risk of diseases in the future, you will notice some benefits soon after you stop using tobacco. If you have shortness of breath or asthma symptoms, they will likely get better within a few weeks after you quit. · Limit how much alcohol you drink. Moderate amounts of alcohol (up to 2 drinks a day for men, 1 drink a day for women) are okay. But drinking too much can lead to liver problems, high blood pressure, and other health problems. Family health If you have a family, there are many things you can do together to improve your health. · Eat meals together as a family as often as possible. · Eat healthy foods. This includes fruits, vegetables, lean meats and dairy, and whole grains. · Include your family in your fitness plan. Most people think of activities such as jogging or tennis as the way to fitness, but there are many ways you and your family can be more active. Anything that makes you breathe hard and gets your heart pumping is exercise. Here are some tips: 
? Walk to do errands or to take your child to school or the bus. 
? Go for a family bike ride after dinner instead of watching TV. Where can you learn more? Go to http://tea-ana.info/. Enter X316 in the search box to learn more about \"A Healthy Lifestyle: Care Instructions. \" Current as of: September 11, 2018 Content Version: 11.9 © 8521-1375 [employer]. Care instructions adapted under license by [employer] (which disclaims liability or warranty for this information). If you have questions about a medical condition or this instruction, always ask your healthcare professional. [place] [address] any warranty or liability for your use of this information.

## 2019-06-25 NOTE — PROGRESS NOTES
[facility]    Subjective:   Clifford Bajwa is a 47 y.o. male with history of HTN, Hypercalcemia, Obesity, GERD, ED, HLD  CC: Follow up HTN, Hypercalcemia  History provided by patient and Records    HPI:  Hypertension Follow up:  Currently Taking Still Lisinopril-HCTZ. The patient reports:  taking medications as instructed, no medication side effects noted, no TIA's, no chest pain on exertion, no dyspnea on exertion, no swelling of ankles. BP Readings from Last 3 Encounters:   06/25/19 107/73   06/06/19 108/73   02/26/19 116/75     Hypercalcemia: Denies muscle/bone aches/pains. Denies neck swelling, significant fatigue, chest pains, palpitations. No known family history hyperparathyroidism or elevated calcium. Mother with Osteoporosis later in life. PFSH:     Current Outpatient Medications on File Prior to Visit   Medication Sig Dispense Refill    lisinopril (PRINIVIL, ZESTRIL) 20 mg tablet Take 1 Tab by mouth daily. 90 Tab 3    metFORMIN ER (GLUCOPHAGE XR) 500 mg tablet Take 1 Tab by mouth daily (with dinner). 90 Tab 1    cyclobenzaprine (FLEXERIL) 10 mg tablet Take 1 Tab by mouth three (3) times daily as needed for Muscle Spasm(s). 60 Tab 2    tadalafil (CIALIS) 5 mg tablet Take 1 Tab by mouth daily. 90 Tab 3    omeprazole (PRILOSEC) 40 mg capsule Take 1 Cap by mouth daily. 90 Cap 3    desloratadine (CLARINEX) 5 mg tablet Take 1 Tab by mouth daily. 90 Tab 3    ibuprofen (MOTRIN) 800 mg tablet Take 1 Tab by mouth every eight (8) hours as needed for Pain. 180 Tab 3    ezetimibe-simvastatin (VYTORIN 10/20) 10-20 mg per tablet Take 1 Tab by mouth daily. 90 Tab 3    chlorhexidine (PERIDEX) 0.12 % solution RINSE MOUTH WITH 15 ML FOR 30 SEC AND SPIT. USE EVERY 12 HOURS AFTER BRUSHING AND FLOSSING 473 mL 5     No current facility-administered medications on file prior to visit.         Patient Active Problem List   Diagnosis Code    History of herniated intervertebral disc Z87.39    Environmental allergies Z91.09    HTN (hypertension) I10    Hyperlipidemia E78.5    ED (erectile dysfunction) of organic origin N52.9    GERD (gastroesophageal reflux disease) K21.9    Obesity, morbid (HCC) E66.01    Serum calcium elevated E83.52       Social History     Socioeconomic History    Marital status:      Spouse name: Not on file    Number of children: Not on file    Years of education: Not on file    Highest education level: Not on file   Occupational History    Not on file   Social Needs    Financial resource strain: Not very hard    Food insecurity:     Worry: Never true     Inability: Never true    Transportation needs:     Medical: No     Non-medical: No   Tobacco Use    Smoking status: Never Smoker    Smokeless tobacco: Never Used   Substance and Sexual Activity    Alcohol use: Yes     Comment: social    Drug use: No    Sexual activity: Not on file   Lifestyle    Physical activity:     Days per week: 0 days     Minutes per session: 0 min    Stress:  Only a little   Relationships    Social connections:     Talks on phone: Not on file     Gets together: Not on file     Attends [employer]oration[employer] service: Not on file     Active member of club or organization: Not on file     Attends meetings of clubs or organizations: Not on file     Relationship status: Not on file    Intimate partner violence:     Fear of current or ex partner: Not on file     Emotionally abused: Not on file     Physically abused: Not on file     Forced sexual activity: Not on file   Other Topics Concern     Service Not Asked    Blood Transfusions Not Asked    Caffeine Concern Not Asked    Occupational Exposure Not Asked   Carletha Cordia Hazards Not Asked    Sleep Concern Not Asked    Stress Concern Not Asked    Weight Concern Yes    Special Diet Not Asked    Back Care Not Asked    Exercise Not Asked    Bike Helmet Not Asked   [address] Not Asked    Self-Exams Not Asked   Social History Narrative    Not on file       Review of Systems   Constitutional: Negative for malaise/fatigue. Cardiovascular: Negative for chest pain and palpitations. Musculoskeletal: Negative for joint pain and myalgias. Objective:     Visit Vitals  /73 (BP 1 Location: Left arm, BP Patient Position: Sitting)   Pulse 66   Temp 97 °F (36.1 °C) (Oral)   Resp 16   Ht 5' 10\" (1.778 m)   Wt 270 lb (122.5 kg)   SpO2 95%   BMI 38.74 kg/m²          Physical Exam   Constitutional: He appears well-developed and well-nourished. No distress. HENT:   Head: Normocephalic and atraumatic. Neck: Normal range of motion. Neck supple. No thyromegaly present. Cardiovascular: Normal rate, regular rhythm, normal heart sounds and intact distal pulses. Exam reveals no gallop and no friction rub. No murmur heard. Pulmonary/Chest: Effort normal and breath sounds normal.   Abdominal: Soft. Bowel sounds are normal.   Musculoskeletal: He exhibits no edema. Nursing note and vitals reviewed. Pertinent Labs/Studies:      Assessment and orders:       ICD-10-CM ICD-9-CM    1. Essential hypertension I10 401.9    2. Serum calcium elevated E83.52 275.42      Diagnoses and all orders for this visit:    1. Essential hypertension: BP is overly controlled overall. Given Hypercalcemia, patient is converting to Lisinopril only once it arrives in mail and will return to office in 1 week after change to check BP.    2. Serum calcium elevated: Patient with mildly elevated calcium with a normal PTH level. Stopping HCTZ, after stopped for 4 weeks repeat BMP/Calcium level with PTH. If Calcium remains elevated with abnormally \"normal\" PTH get US of the thyroid/Parathyroid gland and get 24-hr urine calcium to rule out [address], and Vitamin D level as well. Follow-up and Dispositions    · Return in about 2 weeks (around 7/9/2019) for Follow up Blood pressure. .           I have discussed the diagnosis with the patient and the intended plan as seen in the above orders. Social history, medical history, and labs were reviewed. The patient has received an after-visit summary and questions were answered concerning future plans. I have discussed medication side effects and warnings with the patient as well.     Sahil Myles MD  Resident [facility]  06/25/19    Patient discussed with Dr. Celso Raymond, Attending Physician

## 2019-07-09 ENCOUNTER — OFFICE VISIT (OUTPATIENT)
Dept: FAMILY MEDICINE CLINIC | Age: 49
End: 2019-07-09

## 2019-07-09 VITALS
RESPIRATION RATE: 16 BRPM | HEIGHT: 70 IN | OXYGEN SATURATION: 95 % | WEIGHT: 266 LBS | BODY MASS INDEX: 38.08 KG/M2 | HEART RATE: 60 BPM | TEMPERATURE: 98 F | SYSTOLIC BLOOD PRESSURE: 108 MMHG | DIASTOLIC BLOOD PRESSURE: 77 MMHG

## 2019-07-09 DIAGNOSIS — Z79.899 MEDICATION MANAGEMENT: ICD-10-CM

## 2019-07-09 DIAGNOSIS — Z11.3 ROUTINE SCREENING FOR STI (SEXUALLY TRANSMITTED INFECTION): ICD-10-CM

## 2019-07-09 DIAGNOSIS — E83.52 CALCIUM BLOOD INCREASED: Primary | ICD-10-CM

## 2019-07-09 NOTE — PROGRESS NOTES
Oneida Martinez  47 y.o. male  1970  [address] [address]  [id number]     [facility] Practice: Progress Note       Encounter Date: 7/9/2019    Chief Complaint   Patient presents with    Blood Pressure Check    Medication Evaluation     History of Present Illness   Oneida Martinez is a 47 y.o. male who presents to clinic today for:    BP-states he started the lisinopril 7/1/19 with no CC. Checked blood pressure at home at random thinks the SBP ~107 but could not remember the DBP. Endorses to loosing weight but states he has a challenge due to his job as a . Stressful and sedentary job for [employer]. Monitoring his diet. Asymptomatic in re: to his calcium level. Requesting STI screening. States he has been in a monogamous relationship but questions his female partner fidelity. Health Maintenance    Health Maintenance Due   Topic Date Due    Shingrix Vaccine Age 49> (1 of 2) 03/19/2019    FOBT Q 1 YEAR AGE 50-75  03/19/2019     Review of Systems   Review of Systems   Constitutional: Negative. HENT: Negative. Eyes: Negative. Respiratory: Negative. Cardiovascular: Negative. Gastrointestinal: Negative. Genitourinary: Negative. Musculoskeletal: Negative. Skin: Negative. Neurological: Negative. Endo/Heme/Allergies: Negative. Psychiatric/Behavioral: Negative. Vitals/Objective:     Vitals:    07/09/19 0804   BP: 108/77   Pulse: 60   Resp: 16   Temp: 98 °F (36.7 °C)   TempSrc: Oral   SpO2: 95%   Weight: 266 lb (120.7 kg)   Height: 5' 10\" (1.778 m)     Body mass index is 38.17 kg/m². Physical Exam   Constitutional: He is oriented to person, place, and time. He is active and cooperative. Neck: Trachea normal, normal range of motion, full passive range of motion without pain and phonation normal. Neck supple. No thyroid mass and no thyromegaly present.    Cardiovascular: Normal rate, regular rhythm, normal heart sounds and normal pulses. Pulmonary/Chest: Effort normal and breath sounds normal.   Musculoskeletal: Normal range of motion. Lymphadenopathy:     He has no cervical adenopathy. Neurological: He is alert and oriented to person, place, and time. Skin: Skin is warm, dry and intact. Psychiatric: He has a normal mood and affect. His speech is normal and behavior is normal. Judgment and thought content normal. Cognition and memory are normal.       No results found for this or any previous visit (from the past 24 hour(s)). Assessment and Plan:   1. Routine screening for STI (sexually transmitted infection)    - HIV 1/2 AG/AB, 4TH GENERATION,W RFLX CONFIRM  - RPR  - CT/NG/T.VAGINALIS AMPLIFICATION    2. Calcium blood increased    - METABOLIC PANEL, COMPREHENSIVE  - PTH INTACT    3. Medication management    Per PCP note on 06/25/2019 will re check a BMP/calcium and PTH. Orders placed and patient to f/u in 1 month for lab visit. Discussed if labs are abnormal will then order a ultrasound of the thyroid/parathyroid. Patient would like to get STI screening on the same day as well. Work note provided. Of note patient will call GI re: colonoscopy appointment. Provided patient with info re: sleep apnea and home testing. I have discussed the diagnosis with the patient and the intended plan as seen in the above orders. he has expressed understanding. The patient has received an after-visit summary and questions were answered concerning future plans. I have discussed medication side effects and warnings with the patient as well. Electronically Signed: Carmen Skinner NP     History/Allergies   Patients past medical, surgical and family histories were reviewed and updated. Past Medical History:   Diagnosis Date    ED (erectile dysfunction) of organic origin 4/21/2016    Environmental allergies 5/5/2010    Hyperlipidemia 1/16/2013    Hypertension     History reviewed.  No pertinent surgical history. Family History   Problem Relation Age of Onset    Hypertension Mother     Coronary Artery Disease Mother     Hypertension Father      Social History     Socioeconomic History    Marital status:      Spouse name: Not on file    Number of children: Not on file    Years of education: Not on file    Highest education level: Not on file   Occupational History    Not on file   Social Needs    Financial resource strain: Not very hard    Food insecurity:     Worry: Never true     Inability: Never true    Transportation needs:     Medical: No     Non-medical: No   Tobacco Use    Smoking status: Never Smoker    Smokeless tobacco: Never Used   Substance and Sexual Activity    Alcohol use: Yes     Comment: social    Drug use: No    Sexual activity: Not on file   Lifestyle    Physical activity:     Days per week: 0 days     Minutes per session: 0 min    Stress:  Only a little   Relationships    Social connections:     Talks on phone: Not on file     Gets together: Not on file     Attends Rastafarian service: Not on file     Active member of club or organization: Not on file     Attends meetings of clubs or organizations: Not on file     Relationship status: Not on file    Intimate partner violence:     Fear of current or ex partner: Not on file     Emotionally abused: Not on file     Physically abused: Not on file     Forced sexual activity: Not on file   Other Topics Concern     Service Not Asked    Blood Transfusions Not Asked    Caffeine Concern Not Asked    Occupational Exposure Not Asked   Amanda Daubs Hazards Not Asked    Sleep Concern Not Asked    Stress Concern Not Asked    Weight Concern Yes    Special Diet Not Asked    Back Care Not Asked    Exercise Not Asked    Bike Helmet Not Asked   [address] Not Asked    Self-Exams Not Asked   Social History Narrative    Not on file         No Known Allergies    Disposition     Follow-up and Dispositions  ·   Return if symptoms worsen or fail to improve. No future appointments. Current Medications after this visit     Current Outpatient Medications   Medication Sig    lisinopril (PRINIVIL, ZESTRIL) 20 mg tablet Take 1 Tab by mouth daily.  metFORMIN ER (GLUCOPHAGE XR) 500 mg tablet Take 1 Tab by mouth daily (with dinner).  cyclobenzaprine (FLEXERIL) 10 mg tablet Take 1 Tab by mouth three (3) times daily as needed for Muscle Spasm(s).  tadalafil (CIALIS) 5 mg tablet Take 1 Tab by mouth daily.  omeprazole (PRILOSEC) 40 mg capsule Take 1 Cap by mouth daily.  desloratadine (CLARINEX) 5 mg tablet Take 1 Tab by mouth daily.  ibuprofen (MOTRIN) 800 mg tablet Take 1 Tab by mouth every eight (8) hours as needed for Pain.  ezetimibe-simvastatin (VYTORIN 10/20) 10-20 mg per tablet Take 1 Tab by mouth daily.  chlorhexidine (PERIDEX) 0.12 % solution RINSE MOUTH WITH 15 ML FOR 30 SEC AND SPIT. USE EVERY 12 HOURS AFTER BRUSHING AND FLOSSING     No current facility-administered medications for this visit. There are no discontinued medications.

## 2019-07-09 NOTE — PROGRESS NOTES
1. Have you been to the ER, urgent care clinic since your last visit? Hospitalized since your last visit? no    2. Have you seen or consulted any other health care providers outside of the [address] [place] [address] since your last visit? Include any pap smears or colon screening. no   Reviewed record in preparation for visit and have obtained necessary documentation. Patient did not bring medications to visit for review. Information provided on Advanced Directive, Living Will. Body mass index is 38.17 kg/m².    Health Maintenance Due   Topic Date Due    Shingrix Vaccine Age 50> (1 of 2) 03/19/2019    FOBT Q 1 YEAR AGE 50-75  03/19/2019

## 2019-07-09 NOTE — LETTER
NOTIFICATION RETURN TO WORK / SCHOOL 
 
7/9/2019 8:34 AM 
 
[address] [address] To Whom It May Concern: 
 
Orlando Boone is currently under the care of [facility]. He will return to work/school on: 07/10/2019. Mr. Jigar Trejo was evaluated in this clinic today for chronic conditions. If there are questions or concerns please have the patient contact our office. Sincerely, Preston Moore NP

## 2019-07-09 NOTE — PATIENT INSTRUCTIONS
A Healthy Lifestyle: Care Instructions  Your Care Instructions    A healthy lifestyle can help you feel good, stay at a healthy weight, and have plenty of energy for both work and play. A healthy lifestyle is something you can share with your whole family. A healthy lifestyle also can lower your risk for serious health problems, such as high blood pressure, heart disease, and diabetes. You can follow a few steps listed below to improve your health and the health of your family. Follow-up care is a key part of your treatment and safety. Be sure to make and go to all appointments, and call your doctor if you are having problems. It's also a good idea to know your test results and keep a list of the medicines you take. How can you care for yourself at home? · Do not eat too much sugar, fat, or fast foods. You can still have dessert and treats now and then. The goal is moderation. · Start small to improve your eating habits. Pay attention to portion sizes, drink less juice and soda pop, and eat more fruits and vegetables. ? Eat a healthy amount of food. A 3-ounce serving of meat, for example, is about the size of a deck of cards. Fill the rest of your plate with vegetables and whole grains. ? Limit the amount of soda and sports drinks you have every day. Drink more water when you are thirsty. ? Eat at least 5 servings of fruits and vegetables every day. It may seem like a lot, but it is not hard to reach this goal. A serving or helping is 1 piece of fruit, 1 cup of vegetables, or 2 cups of leafy, raw vegetables. Have an apple or some carrot sticks as an afternoon snack instead of a candy bar. Try to have fruits and/or vegetables at every meal.  · Make exercise part of your daily routine. You may want to start with simple activities, such as walking, bicycling, or slow swimming. Try to be active 30 to 60 minutes every day. You do not need to do all 30 to 60 minutes all at once.  For example, you can exercise 3 times a day for 10 or 20 minutes. Moderate exercise is safe for most people, but it is always a good idea to talk to your doctor before starting an exercise program.  · Keep moving. [place] the lawn, work in the garden, or [employer]. Take the stairs instead of the elevator at work. · If you smoke, quit. People who smoke have an increased risk for heart attack, stroke, cancer, and other lung illnesses. Quitting is hard, but there are ways to boost your chance of quitting tobacco for good. ? Use nicotine gum, patches, or lozenges. ? Ask your doctor about stop-smoking programs and medicines. ? Keep trying. In addition to reducing your risk of diseases in the future, you will notice some benefits soon after you stop using tobacco. If you have shortness of breath or asthma symptoms, they will likely get better within a few weeks after you quit. · Limit how much alcohol you drink. Moderate amounts of alcohol (up to 2 drinks a day for men, 1 drink a day for women) are okay. But drinking too much can lead to liver problems, high blood pressure, and other health problems. Family health  If you have a family, there are many things you can do together to improve your health. · Eat meals together as a family as often as possible. · Eat healthy foods. This includes fruits, vegetables, lean meats and dairy, and whole grains. · Include your family in your fitness plan. Most people think of activities such as jogging or tennis as the way to fitness, but there are many ways you and your family can be more active. Anything that makes you breathe hard and gets your heart pumping is exercise. Here are some tips:  ? Walk to do errands or to take your child to school or the bus.  ? Go for a family bike ride after dinner instead of watching TV. Where can you learn more? Go to http://tea-ana.info/. Enter D368 in the search box to learn more about \"A Healthy Lifestyle: Care Instructions. \"  Current as of: September 11, 2018  Content Version: 11.9  © 0434-7888 [employer]. Care instructions adapted under license by [employer] (which disclaims liability or warranty for this information). If you have questions about a medical condition or this instruction, always ask your healthcare professional. [address] any warranty or liability for your use of this information.

## 2019-08-08 DIAGNOSIS — R73.01 IMPAIRED FASTING GLUCOSE: ICD-10-CM

## 2019-08-08 DIAGNOSIS — E78.2 MIXED HYPERLIPIDEMIA: ICD-10-CM

## 2019-08-08 DIAGNOSIS — Z88.9 H/O SEASONAL ALLERGIES: ICD-10-CM

## 2019-08-08 RX ORDER — EZETIMIBE AND SIMVASTATIN 10; 20 MG/1; MG/1
1 TABLET ORAL DAILY
Qty: 90 TAB | Refills: 3 | Status: SHIPPED | OUTPATIENT
Start: 2019-08-08 | End: 2019-12-06 | Stop reason: SDUPTHER

## 2019-08-08 RX ORDER — METFORMIN HYDROCHLORIDE 500 MG/1
500 TABLET, EXTENDED RELEASE ORAL
Qty: 90 TAB | Refills: 1 | Status: SHIPPED | OUTPATIENT
Start: 2019-08-08 | End: 2019-12-06 | Stop reason: SDUPTHER

## 2019-08-08 RX ORDER — DESLORATADINE 5 MG/1
5 TABLET ORAL DAILY
Qty: 90 TAB | Refills: 3 | Status: SHIPPED | OUTPATIENT
Start: 2019-08-08 | End: 2019-12-06 | Stop reason: SDUPTHER

## 2019-08-09 LAB
ALBUMIN SERPL-MCNC: 4.1 G/DL (ref 3.5–5.5)
ALBUMIN/GLOB SERPL: 1.3 {RATIO} (ref 1.2–2.2)
ALP SERPL-CCNC: 43 IU/L (ref 39–117)
ALT SERPL-CCNC: 20 IU/L (ref 0–44)
AST SERPL-CCNC: 15 IU/L (ref 0–40)
BILIRUB SERPL-MCNC: 0.6 MG/DL (ref 0–1.2)
BUN SERPL-MCNC: 12 MG/DL (ref 6–24)
BUN/CREAT SERPL: 12 (ref 9–20)
CALCIUM SERPL-MCNC: 10.3 MG/DL (ref 8.7–10.2)
CHLORIDE SERPL-SCNC: 106 MMOL/L (ref 96–106)
CO2 SERPL-SCNC: 21 MMOL/L (ref 20–29)
CREAT SERPL-MCNC: 1.02 MG/DL (ref 0.76–1.27)
GLOBULIN SER CALC-MCNC: 3.2 G/DL (ref 1.5–4.5)
GLUCOSE SERPL-MCNC: 100 MG/DL (ref 65–99)
HIV 1+2 AB+HIV1 P24 AG SERPL QL IA: NON REACTIVE
POTASSIUM SERPL-SCNC: 4.2 MMOL/L (ref 3.5–5.2)
PROT SERPL-MCNC: 7.3 G/DL (ref 6–8.5)
PTH-INTACT SERPL-MCNC: 24 PG/ML (ref 15–65)
RPR SER QL: NON REACTIVE
SODIUM SERPL-SCNC: 140 MMOL/L (ref 134–144)

## 2019-10-16 ENCOUNTER — CLINICAL SUPPORT (OUTPATIENT)
Dept: FAMILY MEDICINE CLINIC | Age: 49
End: 2019-10-16

## 2019-10-16 VITALS
HEIGHT: 70 IN | TEMPERATURE: 98.1 F | RESPIRATION RATE: 16 BRPM | BODY MASS INDEX: 38.22 KG/M2 | OXYGEN SATURATION: 98 % | SYSTOLIC BLOOD PRESSURE: 124 MMHG | DIASTOLIC BLOOD PRESSURE: 85 MMHG | WEIGHT: 267 LBS | HEART RATE: 63 BPM

## 2019-10-16 DIAGNOSIS — Z23 ENCOUNTER FOR IMMUNIZATION: Primary | ICD-10-CM

## 2019-12-06 ENCOUNTER — OFFICE VISIT (OUTPATIENT)
Dept: FAMILY MEDICINE CLINIC | Age: 49
End: 2019-12-06

## 2019-12-06 VITALS
WEIGHT: 266 LBS | OXYGEN SATURATION: 94 % | SYSTOLIC BLOOD PRESSURE: 121 MMHG | HEIGHT: 70 IN | BODY MASS INDEX: 38.08 KG/M2 | DIASTOLIC BLOOD PRESSURE: 85 MMHG | TEMPERATURE: 98.5 F | RESPIRATION RATE: 18 BRPM | HEART RATE: 80 BPM

## 2019-12-06 DIAGNOSIS — K21.9 GASTROESOPHAGEAL REFLUX DISEASE WITHOUT ESOPHAGITIS: ICD-10-CM

## 2019-12-06 DIAGNOSIS — Z87.39 HISTORY OF HERNIATED INTERVERTEBRAL DISC: ICD-10-CM

## 2019-12-06 DIAGNOSIS — I10 ESSENTIAL HYPERTENSION: Primary | ICD-10-CM

## 2019-12-06 DIAGNOSIS — R73.01 IMPAIRED FASTING GLUCOSE: ICD-10-CM

## 2019-12-06 DIAGNOSIS — Z88.9 H/O SEASONAL ALLERGIES: ICD-10-CM

## 2019-12-06 DIAGNOSIS — N52.9 ERECTILE DYSFUNCTION, UNSPECIFIED ERECTILE DYSFUNCTION TYPE: ICD-10-CM

## 2019-12-06 DIAGNOSIS — E78.2 MIXED HYPERLIPIDEMIA: ICD-10-CM

## 2019-12-06 PROBLEM — R73.03 PREDIABETES: Chronic | Status: ACTIVE | Noted: 2019-12-06

## 2019-12-06 PROBLEM — J41.0 SIMPLE CHRONIC BRONCHITIS (HCC): Status: ACTIVE | Noted: 2019-12-06

## 2019-12-06 PROBLEM — J41.0 SIMPLE CHRONIC BRONCHITIS (HCC): Chronic | Status: ACTIVE | Noted: 2019-12-06

## 2019-12-06 RX ORDER — CYCLOBENZAPRINE HCL 10 MG
10 TABLET ORAL
Qty: 60 TAB | Refills: 2 | Status: SHIPPED | OUTPATIENT
Start: 2019-12-06 | End: 2021-06-23 | Stop reason: SDUPTHER

## 2019-12-06 RX ORDER — LISINOPRIL 20 MG/1
20 TABLET ORAL DAILY
Qty: 90 TAB | Refills: 3 | Status: SHIPPED | OUTPATIENT
Start: 2019-12-06 | End: 2020-06-17 | Stop reason: SDUPTHER

## 2019-12-06 RX ORDER — TADALAFIL 5 MG/1
5 TABLET ORAL DAILY
Qty: 90 TAB | Refills: 3 | Status: SHIPPED | OUTPATIENT
Start: 2019-12-06 | End: 2020-06-17 | Stop reason: SDUPTHER

## 2019-12-06 RX ORDER — IBUPROFEN 800 MG/1
800 TABLET ORAL
Qty: 180 TAB | Refills: 3 | Status: SHIPPED | OUTPATIENT
Start: 2019-12-06 | End: 2020-06-17 | Stop reason: SDUPTHER

## 2019-12-06 RX ORDER — METFORMIN HYDROCHLORIDE 500 MG/1
500 TABLET, EXTENDED RELEASE ORAL
Qty: 90 TAB | Refills: 1 | Status: SHIPPED | OUTPATIENT
Start: 2019-12-06 | End: 2020-06-17 | Stop reason: SDUPTHER

## 2019-12-06 RX ORDER — EZETIMIBE AND SIMVASTATIN 10; 20 MG/1; MG/1
1 TABLET ORAL DAILY
Qty: 90 TAB | Refills: 3 | Status: SHIPPED | OUTPATIENT
Start: 2019-12-06 | End: 2020-06-17 | Stop reason: SDUPTHER

## 2019-12-06 RX ORDER — OMEPRAZOLE 40 MG/1
40 CAPSULE, DELAYED RELEASE ORAL DAILY
Qty: 90 CAP | Refills: 3 | Status: SHIPPED | OUTPATIENT
Start: 2019-12-06 | End: 2020-06-17 | Stop reason: SDUPTHER

## 2019-12-06 RX ORDER — CHLORHEXIDINE GLUCONATE 1.2 MG/ML
RINSE ORAL
Qty: 473 ML | Refills: 5 | Status: SHIPPED | OUTPATIENT
Start: 2019-12-06 | End: 2021-04-05

## 2019-12-06 RX ORDER — DESLORATADINE 5 MG/1
5 TABLET ORAL DAILY
Qty: 90 TAB | Refills: 3 | Status: SHIPPED | OUTPATIENT
Start: 2019-12-06 | End: 2020-06-17 | Stop reason: SDUPTHER

## 2019-12-06 NOTE — PROGRESS NOTES
1. Have you been to the ER, urgent care clinic since your last visit? Hospitalized since your last visit? No    2. Have you seen or consulted any other health care providers outside of the [address] [place] [address] since your last visit? Include any pap smears or colon screening.  No  Reviewed record in preparation for visit and have necessary documentation  Pt did not bring medication to office visit for review  opportunity was given for questions  Goals that were addressed and/or need to be completed during or after this appointment include    Health Maintenance Due   Topic Date Due    Shingrix Vaccine Age 50> (1 of 2) 03/19/2019

## 2019-12-06 NOTE — PROGRESS NOTES
[facility]    History of Present Illness:   Octavia Norris is a 47 y.o. male with history of HLD, HTN, ED, Environmental Allergies  CC: Chronic conditions follow up  History provided by patient and Records    HPI:  Diabetes Follow up: Overall the patient feels well with good energy level. Current Medications:   Key Antihyperglycemic Medications             metFORMIN ER (GLUCOPHAGE XR) 500 mg tablet (Taking) Take 1 Tab by mouth daily (with dinner). .   Insulin dependence: NO   Frequency of home glucose testing: prn   Blood Sugar range at home: PRN    Last eye exam: In past 12 months. Last foot exam: This year. Polyuria, polyphagia or polydipsia: NO   Retinopathy: NO   Neuropathy SX: NO   Low blood sugar symptoms: NO   Dietary compliance: compliant most of the time   Medication compliance: compliant most of the time   On ASA: YES   Tobacco Use: NO   Depression: NO     Wt Readings from Last 3 Encounters:   12/06/19 266 lb (120.7 kg)   10/16/19 267 lb (121.1 kg)   07/09/19 266 lb (120.7 kg)     Hypertriglyceridemia Follow up:   Cardiovascular risks for him are: diabetic, hypertension. Currently he takes Zocor (simvastatin)/Ezetimibe , 10-20 mg. Myalgias: NO   Fatigue: NO   Other side effects: NO     Wt Readings from Last 3 Encounters:   12/06/19 266 lb (120.7 kg)   10/16/19 267 lb (121.1 kg)   07/09/19 266 lb (120.7 kg)       Hypertension Follow up:  Currently Taking Lisinopril 20 mg. The patient reports:  taking medications as instructed, no medication side effects noted, no TIA's, no chest pain on exertion, no dyspnea on exertion, no swelling of ankles. BP Readings from Last 3 Encounters:   12/06/19 121/85   10/16/19 124/85   07/09/19 108/77     Erectile dysfunction Follow up:  Patient following up about Erectile dysfunction. Patient reports current therapy is effective. He does reports difficulty with both attaining and maintaining erection with intercourse.   His libido is Normal. Since last seen patient denies any current issues with chest pain, SOB, or other cardiac symptoms.  - Current therapies: Cialis  - Side Effects: none  - He is compliant with treatment of contributing conditions  - Patient does not have any new history of cardiac disease and is not on any Nitrate theapy    Gastroesophageal Reflux:  Current control of Symptoms: Stable  Primary symptoms: heartburn  Hiatal Hernia: No  Current Medications: Prilosec  The patient has no history melena or bright red blood in the stools. The patient avoids high dose aspirin and NSAID therapy. The patient is aware of diet changes needed, elevating the head of the bed and appropriate use of antacids. Health Maintenance  Health Maintenance Due   Topic Date Due    Shingrix Vaccine Age 49> (1 of 2) 03/19/2019       Past Medical, Family, and Social History:     Current Outpatient Medications on File Prior to Visit   Medication Sig Dispense Refill    [DISCONTINUED] desloratadine (CLARINEX) 5 mg tablet Take 1 Tab by mouth daily. 90 Tab 3    [DISCONTINUED] ezetimibe-simvastatin (VYTORIN 10/20) 10-20 mg per tablet Take 1 Tab by mouth daily. 90 Tab 3    [DISCONTINUED] metFORMIN ER (GLUCOPHAGE XR) 500 mg tablet Take 1 Tab by mouth daily (with dinner). 90 Tab 1    [DISCONTINUED] lisinopril (PRINIVIL, ZESTRIL) 20 mg tablet Take 1 Tab by mouth daily. 90 Tab 3    [DISCONTINUED] cyclobenzaprine (FLEXERIL) 10 mg tablet Take 1 Tab by mouth three (3) times daily as needed for Muscle Spasm(s). 60 Tab 2    [DISCONTINUED] tadalafil (CIALIS) 5 mg tablet Take 1 Tab by mouth daily. 90 Tab 3    [DISCONTINUED] omeprazole (PRILOSEC) 40 mg capsule Take 1 Cap by mouth daily. 90 Cap 3    [DISCONTINUED] ibuprofen (MOTRIN) 800 mg tablet Take 1 Tab by mouth every eight (8) hours as needed for Pain. 180 Tab 3    [DISCONTINUED] chlorhexidine (PERIDEX) 0.12 % solution RINSE MOUTH WITH 15 ML FOR 30 SEC AND SPIT.  USE EVERY 12 HOURS AFTER BRUSHING AND FLOSSING 473 mL 5     No current facility-administered medications on file prior to visit. Patient Active Problem List   Diagnosis Code    History of herniated intervertebral disc Z87.39    Environmental allergies Z91.09    HTN (hypertension) I10    Hyperlipidemia E78.5    ED (erectile dysfunction) of organic origin N52.9    GERD (gastroesophageal reflux disease) K21.9    Obesity, morbid (HCC) E66.01    Serum calcium elevated E83.52    Simple chronic bronchitis (HCC) J41.0    Simple chronic bronchitis (HCC) J41.0       Social History     Socioeconomic History    Marital status:      Spouse name: Not on file    Number of children: Not on file    Years of education: Not on file    Highest education level: Not on file   Occupational History    Not on file   Social Needs    Financial resource strain: Not very hard    Food insecurity:     Worry: Never true     Inability: Never true    Transportation needs:     Medical: No     Non-medical: No   Tobacco Use    Smoking status: Never Smoker    Smokeless tobacco: Never Used   Substance and Sexual Activity    Alcohol use: Yes     Comment: social    Drug use: No    Sexual activity: Not on file   Lifestyle    Physical activity:     Days per week: 0 days     Minutes per session: 0 min    Stress:  Only a little   Relationships    Social connections:     Talks on phone: Not on file     Gets together: Not on file     Attends Latter-day service: Not on file     Active member of club or organization: Not on file     Attends meetings of clubs or organizations: Not on file     Relationship status: Not on file    Intimate partner violence:     Fear of current or ex partner: Not on file     Emotionally abused: Not on file     Physically abused: Not on file     Forced sexual activity: Not on file   Other Topics Concern     Service Not Asked    Blood Transfusions Not Asked    Caffeine Concern Not Asked    Occupational Exposure Not Asked   Geoffery Fillers Hazards Not Asked    Sleep Concern Not Asked    Stress Concern Not Asked    Weight Concern Yes    Special Diet Not Asked    Back Care Not Asked    Exercise Not Asked    Bike Helmet Not Asked   [address] Not Asked    Self-Exams Not Asked   Social History Narrative    Not on file       Review of Systems   Review of Systems   Constitutional: Negative for fever, malaise/fatigue and weight loss. Respiratory: Negative for cough and shortness of breath. Cardiovascular: Negative for chest pain, palpitations and orthopnea. Objective:     Visit Vitals  /85 (BP 1 Location: Left arm, BP Patient Position: Sitting)   Pulse 80   Temp 98.5 °F (36.9 °C) (Oral)   Resp 18   Ht 5' 10\" (1.778 m)   Wt 266 lb (120.7 kg)   SpO2 94%   BMI 38.17 kg/m²        Physical Exam  Vitals signs and nursing note reviewed. Constitutional:       Appearance: Normal appearance. He is obese. HENT:      Head: Normocephalic and atraumatic. Neck:      Musculoskeletal: Normal range of motion and neck supple. Cardiovascular:      Rate and Rhythm: Normal rate and regular rhythm. Pulses: Normal pulses. Heart sounds: Normal heart sounds. No murmur. No friction rub. No gallop. Pulmonary:      Effort: Pulmonary effort is normal.      Breath sounds: Normal breath sounds. Abdominal:      General: Bowel sounds are normal. There is no distension. Palpations: Abdomen is soft. There is no mass. Skin:     General: Skin is warm and dry. Neurological:      Mental Status: He is alert. Pertinent Labs/Studies:      Assessment and orders:       ICD-10-CM ICD-9-CM    1. Essential hypertension I10 401.9 lisinopril (PRINIVIL, ZESTRIL) 20 mg tablet   2. Gastroesophageal reflux disease without esophagitis K21.9 530.81 omeprazole (PRILOSEC) 40 mg capsule   3. H/O seasonal allergies Z88.9 V15.09 desloratadine (CLARINEX) 5 mg tablet   4.  History of herniated intervertebral disc Z87.39 V13.59 cyclobenzaprine (FLEXERIL) 10 mg tablet      ibuprofen (MOTRIN) 800 mg tablet   5. Impaired fasting glucose R73.01 790.21 metFORMIN ER (GLUCOPHAGE XR) 500 mg tablet   6. Mixed hyperlipidemia E78.2 272.2 ezetimibe-simvastatin (VYTORIN 10/20) 10-20 mg per tablet   7. Erectile dysfunction, unspecified erectile dysfunction type N52.9 607.84 tadalafil (CIALIS) 5 mg tablet     Diagnoses and all orders for this visit:    1. Essential hypertension: Our goal is to normalize the blood pressure to decrease the risks of strokes and heart attacks. The patient is in agreement with the plan. -     lisinopril (PRINIVIL, ZESTRIL) 20 mg tablet; Take 1 Tab by mouth daily. 2. Gastroesophageal reflux disease without esophagitis: Condition and symptoms are well controlled at this time. No changes to therapy at this time. -     omeprazole (PRILOSEC) 40 mg capsule; Take 1 Cap by mouth daily. 3. H/O seasonal allergies: Condition and symptoms are well controlled at this time. No changes to therapy at this time. -     desloratadine (CLARINEX) 5 mg tablet; Take 1 Tab by mouth daily. 4. History of herniated intervertebral disc: Condition and symptoms are well controlled at this time. No changes to therapy at this time. -     cyclobenzaprine (FLEXERIL) 10 mg tablet; Take 1 Tab by mouth three (3) times daily as needed for Muscle Spasm(s). -     ibuprofen (MOTRIN) 800 mg tablet; Take 1 Tab by mouth every eight (8) hours as needed for Pain. 5. Impaired fasting glucose: Prediabetes, Patient is aware that presence of prediabetes indicates risk of developing full diabetes. Patient is also aware that without weight loss and exercise there is greater risk of developing overt diabetes. We discussed diet and lifestyle modifications to improve glycemic control. Patient is on medications at this time. Patient is not interested in seeing a dietician/weight loss program at this time.   -     metFORMIN ER (GLUCOPHAGE XR) 500 mg tablet; Take 1 Tab by mouth daily (with dinner). 6. Mixed hyperlipidemia: The patient is aware of our goal to reduce or eliminate the long term problems (such as strokes and heart attacks) related to poorly controlled Triglycerides, LDL, Cholesterol.  -     ezetimibe-simvastatin (VYTORIN 10/20) 10-20 mg per tablet; Take 1 Tab by mouth daily. 7. Erectile dysfunction, unspecified erectile dysfunction type: Condition and symptoms are well controlled at this time. No changes to therapy at this time. -     tadalafil (CIALIS) 5 mg tablet; Take 1 Tab by mouth daily. Other orders  -     chlorhexidine (PERIDEX) 0.12 % solution; RINSE MOUTH WITH 15 ML FOR 30 SEC AND SPIT. USE EVERY 12 HOURS AFTER BRUSHING AND FLOSSING      Follow-up and Dispositions    · Return in about 3 months (around 3/6/2020) for Follow up chronic conditions. I have discussed the diagnosis with the patient and the intended plan as seen in the above orders. Social history, medical history, and labs were reviewed. The patient has received an after-visit summary and questions were answered concerning future plans. I have discussed medication side effects and warnings with the patient as well.     MD RENA Padilla & ALEXANDRA CHOU [facility]  12/06/19

## 2020-06-17 ENCOUNTER — OFFICE VISIT (OUTPATIENT)
Dept: FAMILY MEDICINE CLINIC | Age: 50
End: 2020-06-17

## 2020-06-17 ENCOUNTER — HOSPITAL ENCOUNTER (OUTPATIENT)
Dept: LAB | Age: 50
Discharge: HOME | End: 2020-06-17

## 2020-06-17 VITALS
RESPIRATION RATE: 18 BRPM | OXYGEN SATURATION: 97 % | TEMPERATURE: 98.2 F | WEIGHT: 262.2 LBS | DIASTOLIC BLOOD PRESSURE: 93 MMHG | SYSTOLIC BLOOD PRESSURE: 139 MMHG | HEIGHT: 70 IN | BODY MASS INDEX: 37.54 KG/M2 | HEART RATE: 57 BPM

## 2020-06-17 DIAGNOSIS — K21.9 GASTROESOPHAGEAL REFLUX DISEASE WITHOUT ESOPHAGITIS: ICD-10-CM

## 2020-06-17 DIAGNOSIS — R73.01 IMPAIRED FASTING GLUCOSE: ICD-10-CM

## 2020-06-17 DIAGNOSIS — Z88.9 H/O SEASONAL ALLERGIES: ICD-10-CM

## 2020-06-17 DIAGNOSIS — I10 ESSENTIAL HYPERTENSION: ICD-10-CM

## 2020-06-17 DIAGNOSIS — Z80.42 FAMILY HISTORY OF PROSTATE CANCER: ICD-10-CM

## 2020-06-17 DIAGNOSIS — E78.2 MIXED HYPERLIPIDEMIA: ICD-10-CM

## 2020-06-17 DIAGNOSIS — N52.9 ERECTILE DYSFUNCTION, UNSPECIFIED ERECTILE DYSFUNCTION TYPE: ICD-10-CM

## 2020-06-17 DIAGNOSIS — M25.512 ACUTE PAIN OF LEFT SHOULDER: Primary | ICD-10-CM

## 2020-06-17 DIAGNOSIS — Z87.39 HISTORY OF HERNIATED INTERVERTEBRAL DISC: ICD-10-CM

## 2020-06-17 LAB
ALBUMIN SERPL-MCNC: 3.9 G/DL (ref 3.5–5)
ALBUMIN/GLOB SERPL: 1 {RATIO} (ref 1.1–2.2)
ALP SERPL-CCNC: 58 U/L (ref 45–117)
ALT SERPL-CCNC: 30 U/L (ref 12–78)
ANION GAP SERPL CALC-SCNC: 6 MMOL/L (ref 5–15)
AST SERPL-CCNC: 19 U/L (ref 15–37)
BILIRUB SERPL-MCNC: 0.7 MG/DL (ref 0.2–1)
BUN SERPL-MCNC: 12 MG/DL (ref 6–20)
BUN/CREAT SERPL: 12 (ref 12–20)
CALCIUM SERPL-MCNC: 10.5 MG/DL (ref 8.5–10.1)
CHLORIDE SERPL-SCNC: 106 MMOL/L (ref 97–108)
CHOLEST SERPL-MCNC: 136 MG/DL
CO2 SERPL-SCNC: 27 MMOL/L (ref 21–32)
CREAT SERPL-MCNC: 1.01 MG/DL (ref 0.7–1.3)
ERYTHROCYTE [DISTWIDTH] IN BLOOD BY AUTOMATED COUNT: 14 % (ref 11.5–14.5)
EST. AVERAGE GLUCOSE BLD GHB EST-MCNC: 108 MG/DL
GLOBULIN SER CALC-MCNC: 3.8 G/DL (ref 2–4)
GLUCOSE SERPL-MCNC: 78 MG/DL (ref 65–100)
HBA1C MFR BLD: 5.4 % (ref 4–5.6)
HCT VFR BLD AUTO: 45.1 % (ref 36.6–50.3)
HDLC SERPL-MCNC: 36 MG/DL
HDLC SERPL: 3.8 {RATIO} (ref 0–5)
HGB BLD-MCNC: 14.6 G/DL (ref 12.1–17)
LDLC SERPL CALC-MCNC: 63.2 MG/DL (ref 0–100)
LIPID PROFILE,FLP: ABNORMAL
MCH RBC QN AUTO: 29.9 PG (ref 26–34)
MCHC RBC AUTO-ENTMCNC: 32.4 G/DL (ref 30–36.5)
MCV RBC AUTO: 92.2 FL (ref 80–99)
NRBC # BLD: 0 K/UL (ref 0–0.01)
NRBC BLD-RTO: 0 PER 100 WBC
PLATELET # BLD AUTO: 312 K/UL (ref 150–400)
PMV BLD AUTO: 10.9 FL (ref 8.9–12.9)
POTASSIUM SERPL-SCNC: 4.3 MMOL/L (ref 3.5–5.1)
PROT SERPL-MCNC: 7.7 G/DL (ref 6.4–8.2)
RBC # BLD AUTO: 4.89 M/UL (ref 4.1–5.7)
SODIUM SERPL-SCNC: 139 MMOL/L (ref 136–145)
TRIGL SERPL-MCNC: 184 MG/DL (ref ?–150)
VLDLC SERPL CALC-MCNC: 36.8 MG/DL
WBC # BLD AUTO: 5.9 K/UL (ref 4.1–11.1)

## 2020-06-17 RX ORDER — EZETIMIBE AND SIMVASTATIN 10; 20 MG/1; MG/1
1 TABLET ORAL DAILY
Qty: 90 TAB | Refills: 3 | Status: SHIPPED | OUTPATIENT
Start: 2020-06-17 | End: 2021-06-23 | Stop reason: SDUPTHER

## 2020-06-17 RX ORDER — DESLORATADINE 5 MG/1
5 TABLET ORAL DAILY
Qty: 90 TAB | Refills: 3 | Status: SHIPPED | OUTPATIENT
Start: 2020-06-17 | End: 2021-06-23 | Stop reason: SDUPTHER

## 2020-06-17 RX ORDER — LISINOPRIL 20 MG/1
20 TABLET ORAL DAILY
Qty: 90 TAB | Refills: 3 | Status: SHIPPED | OUTPATIENT
Start: 2020-06-17 | End: 2021-06-23 | Stop reason: SDUPTHER

## 2020-06-17 RX ORDER — OMEPRAZOLE 40 MG/1
40 CAPSULE, DELAYED RELEASE ORAL DAILY
Qty: 90 CAP | Refills: 3 | Status: SHIPPED | OUTPATIENT
Start: 2020-06-17 | End: 2021-06-23 | Stop reason: SDUPTHER

## 2020-06-17 RX ORDER — MELOXICAM 15 MG/1
15 TABLET ORAL DAILY
Qty: 30 TAB | Refills: 1 | Status: SHIPPED | OUTPATIENT
Start: 2020-06-17 | End: 2021-06-23 | Stop reason: SDUPTHER

## 2020-06-17 RX ORDER — IBUPROFEN 800 MG/1
800 TABLET ORAL
Qty: 180 TAB | Refills: 3 | Status: SHIPPED | OUTPATIENT
Start: 2020-06-17 | End: 2021-06-23 | Stop reason: SDUPTHER

## 2020-06-17 RX ORDER — METFORMIN HYDROCHLORIDE 500 MG/1
500 TABLET, EXTENDED RELEASE ORAL
Qty: 90 TAB | Refills: 1 | Status: SHIPPED | OUTPATIENT
Start: 2020-06-17 | End: 2021-01-07

## 2020-06-17 RX ORDER — TADALAFIL 5 MG/1
5 TABLET ORAL DAILY
Qty: 90 TAB | Refills: 3 | Status: SHIPPED | OUTPATIENT
Start: 2020-06-17 | End: 2021-06-23 | Stop reason: SDUPTHER

## 2020-06-17 NOTE — PROGRESS NOTES
[facility]    History of Present Illness:   Carolina Blank is a 45 y.o. male with history of Environmental Allergies, HLD, ED, HTN  CC: Follow up Chronic Conditions  History provided by patient and Records    HPI:  Left arm Pain: Angie Nick of the back of the truck on March 18th, has had improved pain and motion with injection in the shoulder and time. Also noting having exacerbated back pain and sciatica. Is improving but slowly. Hypertension Follow up:  Currently Taking Lisinopril 20 mg. The patient reports:  taking medications as instructed, no medication side effects noted, home BP monitoring in range of 713'R systolic over 47'E diastolic, no TIA's, no chest pain on exertion, no dyspnea on exertion, no swelling of ankles. BP Readings from Last 3 Encounters:   06/17/20 (!) 158/92   12/06/19 121/85   10/16/19 124/85     Erectile Dysfunction: Controlled on Cialis. Gastroesophageal Reflux:  Current control of Symptoms: Stable  Primary symptoms: heartburn  Hiatal Hernia: No  Current Medications: Prilosec  The patient has no history melena or bright red blood in the stools. The patient avoids high dose aspirin and NSAID therapy. The patient is aware of diet changes needed, elevating the head of the bed and appropriate use of antacids. Diabetes Follow up: Overall the patient feels well with good energy level. Current Medications:   Key Antihyperglycemic Medications             metFORMIN ER (GLUCOPHAGE XR) 500 mg tablet (Taking) Take 1 Tab by mouth daily (with dinner). .   Insulin dependence: NO   Frequency of home glucose testing: prn   Blood Sugar range at home: N/A    Last eye exam: In past 12 months. Last foot exam: This year.    Polyuria, polyphagia or polydipsia: NO   Retinopathy: NO   Neuropathy SX: NO   Low blood sugar symptoms: NO   Dietary compliance: compliant most of the time   Medication compliance: compliant most of the time   On ASA: NO   Tobacco Use: NO   Depression: NO     Wt Readings from Last 3 Encounters:   06/17/20 262 lb 3.2 oz (118.9 kg)   12/06/19 266 lb (120.7 kg)   10/16/19 267 lb (121.1 kg)         Health Maintenance  Health Maintenance Due   Topic Date Due    Shingrix Vaccine Age 49> (1 of 2) 03/19/2019    A1C test (Diabetic or Prediabetic)  02/01/2020    Lipid Screen  06/06/2020       Past Medical, Family, and Social History:     Current Outpatient Medications on File Prior to Visit   Medication Sig Dispense Refill    cyclobenzaprine (FLEXERIL) 10 mg tablet Take 1 Tab by mouth three (3) times daily as needed for Muscle Spasm(s). 60 Tab 2    chlorhexidine (PERIDEX) 0.12 % solution RINSE MOUTH WITH 15 ML FOR 30 SEC AND SPIT. USE EVERY 12 HOURS AFTER BRUSHING AND FLOSSING 473 mL 5    [DISCONTINUED] tadalafil (CIALIS) 5 mg tablet Take 1 Tab by mouth daily. 90 Tab 3    [DISCONTINUED] lisinopril (PRINIVIL, ZESTRIL) 20 mg tablet Take 1 Tab by mouth daily. 90 Tab 3    [DISCONTINUED] omeprazole (PRILOSEC) 40 mg capsule Take 1 Cap by mouth daily. 90 Cap 3    [DISCONTINUED] desloratadine (CLARINEX) 5 mg tablet Take 1 Tab by mouth daily. 90 Tab 3    [DISCONTINUED] ibuprofen (MOTRIN) 800 mg tablet Take 1 Tab by mouth every eight (8) hours as needed for Pain. 180 Tab 3    [DISCONTINUED] metFORMIN ER (GLUCOPHAGE XR) 500 mg tablet Take 1 Tab by mouth daily (with dinner). 90 Tab 1    [DISCONTINUED] ezetimibe-simvastatin (VYTORIN 10/20) 10-20 mg per tablet Take 1 Tab by mouth daily. 90 Tab 3     No current facility-administered medications on file prior to visit.         Patient Active Problem List   Diagnosis Code    History of herniated intervertebral disc Z87.39    Environmental allergies Z91.09    HTN (hypertension) I10    Hyperlipidemia E78.5    ED (erectile dysfunction) of organic origin N52.9    GERD (gastroesophageal reflux disease) K21.9    Obesity, morbid (HCC) E66.01    Serum calcium elevated E83.52    Simple chronic bronchitis ([address] J41.0    Simple chronic bronchitis (HCC) J41.0    Prediabetes R73.03       Social History     Socioeconomic History    Marital status:      Spouse name: Not on file    Number of children: Not on file    Years of education: Not on file    Highest education level: Not on file   Occupational History    Not on file   Social Needs    Financial resource strain: Not very hard    Food insecurity     Worry: Never true     Inability: Never true    Transportation needs     Medical: No     Non-medical: No   Tobacco Use    Smoking status: Never Smoker    Smokeless tobacco: Never Used   Substance and Sexual Activity    Alcohol use: Yes     Comment: social    Drug use: No    Sexual activity: Not on file   Lifestyle    Physical activity     Days per week: 0 days     Minutes per session: 0 min    Stress: Only a little   Relationships    Social connections     Talks on phone: Not on file     Gets together: Not on file     Attends [employer] service: Not on file     Active member of club or organization: Not on file     Attends meetings of clubs or organizations: Not on file     Relationship status: Not on file    Intimate partner violence     Fear of current or ex partner: Not on file     Emotionally abused: Not on file     Physically abused: Not on file     Forced sexual activity: Not on file   Other Topics Concern     Service Not Asked    Blood Transfusions Not Asked    Caffeine Concern Not Asked    Occupational Exposure Not Asked   Vallorie Marcia Hazards Not Asked    Sleep Concern Not Asked    Stress Concern Not Asked    Weight Concern Yes    Special Diet Not Asked    Back Care Not Asked    Exercise Not Asked    Bike Helmet Not Asked   [address] Not Asked    Self-Exams Not Asked   Social History Narrative    Not on file       Review of Systems   Review of Systems   Constitutional: Negative for chills and fever. HENT: Negative for congestion. Respiratory: Negative for cough. Cardiovascular: Negative for chest pain and palpitations. Musculoskeletal: Positive for joint pain. Objective:     Visit Vitals  BP (!) 158/92 (BP 1 Location: Left arm, BP Patient Position: Sitting)   Pulse (!) 57   Temp 98.2 °F (36.8 °C) (Oral)   Resp 18   Ht 5' 10\" (1.778 m)   Wt 262 lb 3.2 oz (118.9 kg)   SpO2 97%   BMI 37.62 kg/m²        Physical Exam  Vitals signs and nursing note reviewed. Constitutional:       Appearance: Normal appearance. HENT:      Head: Normocephalic and atraumatic. Eyes:      Extraocular Movements: Extraocular movements intact. Pupils: Pupils are equal, round, and reactive to light. Neck:      Musculoskeletal: Normal range of motion and neck supple. Cardiovascular:      Rate and Rhythm: Normal rate and regular rhythm. Pulses: Normal pulses. Heart sounds: Normal heart sounds. No murmur. No friction rub. No gallop. Pulmonary:      Effort: Pulmonary effort is normal.      Breath sounds: Normal breath sounds. Abdominal:      General: Abdomen is flat. Bowel sounds are normal.      Palpations: Abdomen is soft. Musculoskeletal:         General: No tenderness. Comments: Pain with rotation against resistance   Skin:     General: Skin is warm and dry. Neurological:      Mental Status: He is alert. Pertinent Labs/Studies:      Assessment and orders:       ICD-10-CM ICD-9-CM    1. Acute pain of left shoulder M25.512 719.41 meloxicam (MOBIC) 15 mg tablet   2. Essential hypertension I10 401.9 lisinopriL (PRINIVIL, ZESTRIL) 20 mg tablet      CBC W/O DIFF      METABOLIC PANEL, COMPREHENSIVE   3. Gastroesophageal reflux disease without esophagitis K21.9 530.81 omeprazole (PRILOSEC) 40 mg capsule   4. Erectile dysfunction, unspecified erectile dysfunction type N52.9 607.84 tadalafiL (CIALIS) 5 mg tablet   5. Impaired fasting glucose R73.01 790.21 metFORMIN ER (GLUCOPHAGE XR) 500 mg tablet      HEMOGLOBIN A1C WITH EAG   6.  Mixed hyperlipidemia E78.2 272.2 ezetimibe-simvastatin (Vytorin 10/20) 10-20 mg per tablet      LIPID PANEL   7. H/O seasonal allergies Z88.9 V15.09 desloratadine (CLARINEX) 5 mg tablet   8. History of herniated intervertebral disc Z87.39 V13.59 ibuprofen (MOTRIN) 800 mg tablet   9. Family history of prostate cancer Z80.42 V16.42 PSA W/ REFLX FREE PSA     Diagnoses and all orders for this visit:    1. Acute pain of left shoulder: Trauma, possible rotator tendinopathy component. Trial of Mobic, follow up in 2-3 weeks. -     meloxicam (MOBIC) 15 mg tablet; Take 1 Tab by mouth daily. 2. Essential hypertension: Elevated in office but home and previous reading riya controlled. Follow up in 2-3 weeks, if still elevated will discuss increasing medications. -     lisinopriL (PRINIVIL, ZESTRIL) 20 mg tablet; Take 1 Tab by mouth daily.  -     CBC W/O DIFF; Future  -     METABOLIC PANEL, COMPREHENSIVE; Future    3. Gastroesophageal reflux disease without esophagitis: Condition and symptoms are well controlled at this time. No changes to therapy at this time. -     omeprazole (PRILOSEC) 40 mg capsule; Take 1 Cap by mouth daily. 4. Erectile dysfunction, unspecified erectile dysfunction type: Condition and symptoms are well controlled at this time. No changes to therapy at this time. -     tadalafiL (CIALIS) 5 mg tablet; Take 1 Tab by mouth daily. 5. Impaired fasting glucose: Patient is aware that presence of prediabetes indicates risk of developing full diabetes. Patient is also aware that without weight loss and exercise there is greater risk of developing overt diabetes. We discussed diet and lifestyle modifications to improve glycemic control. Patient is on medications at this time. Patient is not interested in seeing a dietician/weight loss program at this time. -     metFORMIN ER (GLUCOPHAGE XR) 500 mg tablet; Take 1 Tab by mouth daily (with dinner). -     HEMOGLOBIN A1C WITH EAG; Future    6.  Mixed hyperlipidemia: The patient is aware of our goal to reduce or eliminate the long term problems (such as strokes and heart attacks) related to poorly controlled Triglycerides, LDL, Cholesterol.   -     ezetimibe-simvastatin (Vytorin 10/20) 10-20 mg per tablet; Take 1 Tab by mouth daily.  -     LIPID PANEL; Future    7. H/O seasonal allergies: Condition and symptoms are well controlled at this time. No changes to therapy at this time. -     desloratadine (CLARINEX) 5 mg tablet; Take 1 Tab by mouth daily. 8. History of herniated intervertebral disc: Refill for use in the future. Holding for now for Mobic use. -     ibuprofen (MOTRIN) 800 mg tablet; Take 1 Tab by mouth every eight (8) hours as needed for Pain. 9. Family history of prostate cancer: Labs  -     PSA W/ REFLX FREE PSA; Future      Follow-up and Dispositions    · Return in about 6 months (around 12/17/2020). I have discussed the diagnosis with the patient and the intended plan as seen in the above orders. Social history, medical history, and labs were reviewed. The patient has received an after-visit summary and questions were answered concerning future plans. I have discussed medication side effects and warnings with the patient as well.     MD RENA Toney & ALEXANDRA CHOU [facility]  06/17/20

## 2020-06-17 NOTE — PROGRESS NOTES
Chief Complaint   Patient presents with    Medication Refill     Visit Vitals  BP (!) 158/92 (BP 1 Location: Left arm, BP Patient Position: Sitting)   Pulse (!) 57   Temp 98.2 °F (36.8 °C) (Oral)   Resp 18   Ht 5' 10\" (1.778 m)   Wt 262 lb 3.2 oz (118.9 kg)   SpO2 97%   BMI 37.62 kg/m²       1. Have you been to the ER, urgent care clinic since your last visit? Hospitalized since your last visit? Yes [address] [facility]    2. Have you seen or consulted any other health care providers outside of the [address] [place] [address] since your last visit? Include any pap smears or colon screening.  No    Reviewed record in preparation for visit and have necessary documentation  Pt did not bring medication to office visit for review  opportunity was given for questions  Goals that were addressed and/or need to be completed during or after this appointment include   Health Maintenance Due   Topic Date Due    Shingrix Vaccine Age 50> (1 of 2) 03/19/2019    A1C test (Diabetic or Prediabetic)  02/01/2020    Lipid Screen  06/06/2020

## 2020-06-17 NOTE — PATIENT INSTRUCTIONS
- Please take Tylenol and either Motrin or Mobic together. Rotator Cuff: Exercises Introduction Here are some examples of exercises for you to try. The exercises may be suggested for a condition or for rehabilitation. Start each exercise slowly. Ease off the exercises if you start to have pain. You will be told when to start these exercises and which ones will work best for you. How to do the exercises Pendulum swing If you have pain in your back, do not do this exercise. 1. Hold on to a table or the back of a chair with your good arm. Then bend forward a little and let your sore arm hang straight down. This exercise does not use the arm muscles. Rather, use your legs and your hips to create movement that makes your arm swing freely. 2. Use the movement from your hips and legs to guide the slightly swinging arm back and forth like a pendulum (or elephant trunk). Then guide it in circles that start small (about the size of a dinner plate). Make the circles a bit larger each day, as your pain allows. 3. Do this exercise for 5 minutes, 5 to 7 times each day. 4. As you have less pain, try bending over a little farther to do this exercise. This will increase the amount of movement at your shoulder. Posterior stretching exercise 1. Hold the elbow of your injured arm with your other hand. 2. Use your hand to pull your injured arm gently up and across your body. You will feel a gentle stretch across the back of your injured shoulder. 3. Hold for at least 15 to 30 seconds. Then slowly lower your arm. 4. Repeat 2 to 4 times. Up-the-back stretch Your doctor or physical therapist may want you to wait to do this stretch until you have regained most of your range of motion and strength. You can do this stretch in different ways. Hold any of these stretches for at least 15 to 30 seconds. Repeat them 2 to 4 times. 1. Light stretch: Put your hand in your back pocket.  Let it rest there to stretch your shoulder. 2. Moderate stretch: With your other hand, hold your injured arm (palm outward) behind your back by the wrist. Pull your arm up gently to stretch your shoulder. 3. Advanced stretch: Put a towel over your other shoulder. Put the hand of your injured arm behind your back. Now hold the back end of the towel. With the other hand, hold the front end of the towel in front of your body. Pull gently on the front end of the towel. This will bring your hand farther up your back to stretch your shoulder. Overhead stretch 1. Standing about an arm's length away, grasp onto a solid surface. You could use a countertop, a doorknob, or the back of a sturdy chair. 2. With your knees slightly bent, bend forward with your arms straight. Lower your upper body, and let your shoulders stretch. 3. As your shoulders are able to stretch farther, you may need to take a step or two backward. 4. Hold for at least 15 to 30 seconds. Then stand up and relax. If you had stepped back during your stretch, step forward so you can keep your hands on the solid surface. 5. Repeat 2 to 4 times. Shoulder flexion (lying down) To make a wand for this exercise, use a piece of PVC pipe or a broom handle with the broom removed. Make the wand about a foot wider than your shoulders. 1. Lie on your back, holding a wand with both hands. Your palms should face down as you hold the wand. 2. Keeping your elbows straight, slowly raise your arms over your head. Raise them until you feel a stretch in your shoulders, upper back, and chest. 
3. Hold for 15 to 30 seconds. 4. Repeat 2 to 4 times. Shoulder rotation (lying down) To make a wand for this exercise, use a piece of PVC pipe or a broom handle with the broom removed. Make the wand about a foot wider than your shoulders. 1. Lie on your back. Hold a wand with both hands with your elbows bent and palms up. 2. Keep your elbows close to your body, and move the wand across your body toward the sore arm. 3. Hold for 8 to 12 seconds. 4. Repeat 2 to 4 times. Wall climbing (to the side) Avoid any movement that is straight to your side, and be careful not to arch your back. Your arm should stay about 30 degrees to the front of your side. 1. Stand with your side to a wall so that your fingers can just touch it at an angle about 30 degrees toward the front of your body. 2. Walk the fingers of your injured arm up the wall as high as pain permits. Try not to shrug your shoulder up toward your ear as you move your arm up. 3. Hold that position for a count of at least 15 to 20. 
4. Walk your fingers back down to the starting position. 5. Repeat at least 2 to 4 times. Try to reach higher each time. Wall climbing (to the front) During this stretching exercise, be careful not to arch your back. 1. Face a wall, and stand so your fingers can just touch it. 2. Keeping your shoulder down, walk the fingers of your injured arm up the wall as high as pain permits. (Don't shrug your shoulder up toward your ear.) 3. Hold your arm in that position for at least 15 to 30 seconds. 4. Slowly walk your fingers back down to where you started. 5. Repeat at least 2 to 4 times. Try to reach higher each time. Shoulder blade squeeze 1. Stand with your arms at your sides, and squeeze your shoulder blades together. Do not raise your shoulders up as you squeeze. 2. Hold 6 seconds. 3. Repeat 8 to 12 times. Scapular exercise: Arm reach 1. Lie flat on your back. This exercise is a very slight motion that starts with your arms raised (elbows straight, arms straight). 2. From this position, reach higher toward the carroll or ceiling. Keep your elbows straight. All motion should be from your shoulder blade only. 3. Relax your arms back to where you started. 4. Repeat 8 to 12 times. Arm raise to the side During this strengthening exercise, your arm should stay about 30 degrees to the front of your side. 1. Slowly raise your injured arm to the side, with your thumb facing up. Raise your arm 60 degrees at the most (shoulder level is 90 degrees). 2. Hold the position for 3 to 5 seconds. Then lower your arm back to your side. If you need to, bring your \"good\" arm across your body and place it under the elbow as you lower your injured arm. Use your good arm to keep your injured arm from dropping down too fast. 
3. Repeat 8 to 12 times. 4. When you first start out, don't hold any extra weight in your hand. As you get stronger, you may use a 1-pound to 2-pound dumbbell or a small can of food. Shoulder flexor and extensor exercise These are isometric exercises. That means you contract your muscles without actually moving. 1. Push forward (flex): Stand facing a wall or doorjamb, about 6 inches or less back. Hold your injured arm against your body. Make a closed fist with your thumb on top. Then gently push your hand forward into the wall with about 25% to 50% of your strength. Don't let your body move backward as you push. Hold for about 6 seconds. Relax for a few seconds. Repeat 8 to 12 times. 2. Push backward (extend): Stand with your back flat against a wall. Your upper arm should be against the wall, with your elbow bent 90 degrees (your hand straight ahead). Push your elbow gently back against the wall with about 25% to 50% of your strength. Don't let your body move forward as you push. Hold for about 6 seconds. Relax for a few seconds. Repeat 8 to 12 times. Scapular exercise: Wall push-ups This exercise is best done with your fingers somewhat turned out, rather than straight up and down. 1. Stand facing a wall, about 12 inches to 18 inches away. 2. Place your hands on the wall at shoulder height. 3. Slowly bend your elbows and bring your face to the wall. Keep your back and hips straight. 4. Push back to where you started. 5. Repeat 8 to 12 times. 6. When you can do this exercise against a wall comfortably, you can try it against a counter. You can then slowly progress to the end of a couch, then to a sturdy chair, and finally to the floor. Scapular exercise: Retraction For this exercise, you will need elastic exercise material, such as surgical tubing or Thera-Band. 1. Put the band around a solid object at about waist level. (A bedpost will work well.) Each hand should hold an end of the band. 2. With your elbows at your sides and bent to 90 degrees, pull the band back. Your shoulder blades should move toward each other. Then move your arms back where you started. 3. Repeat 8 to 12 times. 4. If you have good range of motion in your shoulders, try this exercise with your arms lifted out to the sides. Keep your elbows at a 90-degree angle. Raise the elastic band up to about shoulder level. Pull the band back to move your shoulder blades toward each other. Then move your arms back where you started. Internal rotator strengthening exercise 1. Start by tying a piece of elastic exercise material to a doorknob. You can use surgical tubing or Thera-Band. 2. Stand or sit with your shoulder relaxed and your elbow bent 90 degrees. Your upper arm should rest comfortably against your side. Squeeze a rolled towel between your elbow and your body for comfort. This will help keep your arm at your side. 3. Hold one end of the elastic band in the hand of the painful arm. 4. Slowly rotate your forearm toward your body until it touches your belly. Slowly move it back to where you started. 5. Keep your elbow and upper arm firmly tucked against the towel roll or at your side. 6. Repeat 8 to 12 times. External rotator strengthening exercise 1. Start by tying a piece of elastic exercise material to a doorknob. You can use surgical tubing or Thera-Band.  (You may also hold one end of the band in each hand.) 2. Stand or sit with your shoulder relaxed and your elbow bent 90 degrees. Your upper arm should rest comfortably against your side. Squeeze a rolled towel between your elbow and your body for comfort. This will help keep your arm at your side. 3. Hold one end of the elastic band with the hand of the painful arm. 4. Start with your forearm across your belly. Slowly rotate the forearm out away from your body. Keep your elbow and upper arm tucked against the towel roll or the side of your body until you begin to feel tightness in your shoulder. Slowly move your arm back to where you started. 5. Repeat 8 to 12 times. Follow-up care is a key part of your treatment and safety. Be sure to make and go to all appointments, and call your doctor if you are having problems. It's also a good idea to know your test results and keep a list of the medicines you take. Where can you learn more? Go to http://tea-ana.info/ Enter Heide Sewell in the search box to learn more about \"Rotator Cuff: Exercises. \" Current as of: March 2, 2020               Content Version: 12.5 © 1169-8297 [employer]. Care instructions adapted under license by [employer] (which disclaims liability or warranty for this information). If you have questions about a medical condition or this instruction, always ask your healthcare professional. [address] any warranty or liability for your use of this information.

## 2020-06-18 NOTE — PROGRESS NOTES
A1C is now narmalized. CHolesterol panel unremarkable except for mild elevation Triglycerides (Non-fasting). Calcium remains mildly elevated, but asymptomatic. CBC is normal.  Awaiting PSA.

## 2020-06-19 LAB
PSA SERPL-MCNC: 1.8 NG/ML (ref 0–4)
REFLEX CRITERIA: NORMAL

## 2020-08-21 ENCOUNTER — OFFICE VISIT (OUTPATIENT)
Dept: FAMILY MEDICINE CLINIC | Age: 50
End: 2020-08-21
Payer: COMMERCIAL

## 2020-08-21 VITALS
BODY MASS INDEX: 37.42 KG/M2 | DIASTOLIC BLOOD PRESSURE: 82 MMHG | WEIGHT: 261.4 LBS | HEIGHT: 70 IN | SYSTOLIC BLOOD PRESSURE: 121 MMHG | HEART RATE: 60 BPM | RESPIRATION RATE: 18 BRPM | TEMPERATURE: 99.3 F | OXYGEN SATURATION: 97 %

## 2020-08-21 DIAGNOSIS — I10 ESSENTIAL HYPERTENSION: Primary | ICD-10-CM

## 2020-08-21 PROCEDURE — 99213 OFFICE O/P EST LOW 20 MIN: CPT | Performed by: FAMILY MEDICINE

## 2020-08-21 NOTE — LETTER
NOTIFICATION OF RETURN TO WORK / SCHOOL 
 
8/21/2020 3:15 PM 
 
. [address] [address] Angelika Vincent To Whom It May Concern: 
 
Donavon Palacios was under the care of [employer] [facility]. He will be able to return to work/school on 08/23/2020. Please excuse his absence 08/21-08/22/2020 If there are questions or concerns please have the patient contact our office. Sincerely, Marylou Cm MD

## 2020-08-21 NOTE — PROGRESS NOTES
[facility]    History of Present Illness:   Octavia Norris is a 45 y.o. male with history of HTN, GERD, ED  CC: HTN  History provided by patient and Records    HPI:  Hypertension Follow up:  Currently Taking Lisinopril 20 mg. The patient reports:  taking medications as instructed, no medication side effects noted, no TIA's, no chest pain on exertion, no dyspnea on exertion, no swelling of ankles. BP Readings from Last 3 Encounters:   08/21/20 121/82   06/17/20 (!) 139/93   12/06/19 121/85        Health Maintenance  Health Maintenance Due   Topic Date Due    Shingrix Vaccine Age 50> (1 of 2) 03/19/2019       Past Medical, Family, and Social History:     Current Outpatient Medications on File Prior to Visit   Medication Sig Dispense Refill    lisinopriL (PRINIVIL, ZESTRIL) 20 mg tablet Take 1 Tab by mouth daily. 90 Tab 3    omeprazole (PRILOSEC) 40 mg capsule Take 1 Cap by mouth daily. 90 Cap 3    tadalafiL (CIALIS) 5 mg tablet Take 1 Tab by mouth daily. 90 Tab 3    metFORMIN ER (GLUCOPHAGE XR) 500 mg tablet Take 1 Tab by mouth daily (with dinner). 90 Tab 1    ezetimibe-simvastatin (Vytorin 10/20) 10-20 mg per tablet Take 1 Tab by mouth daily. 90 Tab 3    desloratadine (CLARINEX) 5 mg tablet Take 1 Tab by mouth daily. 90 Tab 3    ibuprofen (MOTRIN) 800 mg tablet Take 1 Tab by mouth every eight (8) hours as needed for Pain. 180 Tab 3    meloxicam (MOBIC) 15 mg tablet Take 1 Tab by mouth daily. 30 Tab 1    cyclobenzaprine (FLEXERIL) 10 mg tablet Take 1 Tab by mouth three (3) times daily as needed for Muscle Spasm(s). 60 Tab 2    chlorhexidine (PERIDEX) 0.12 % solution RINSE MOUTH WITH 15 ML FOR 30 SEC AND SPIT. USE EVERY 12 HOURS AFTER BRUSHING AND FLOSSING 473 mL 5     No current facility-administered medications on file prior to visit.         Patient Active Problem List   Diagnosis Code    History of herniated intervertebral disc Z87.39    Environmental allergies Z91.09  HTN (hypertension) I10    Hyperlipidemia E78.5    ED (erectile dysfunction) of organic origin N52.9    GERD (gastroesophageal reflux disease) K21.9    Obesity, morbid (HCC) E66.01    Serum calcium elevated E83.52    Simple chronic bronchitis (HCC) J41.0    Simple chronic bronchitis (HCC) J41.0    Prediabetes R73.03       Social History     Socioeconomic History    Marital status:      Spouse name: Not on file    Number of children: Not on file    Years of education: Not on file    Highest education level: Not on file   Occupational History    Not on file   Social Needs    Financial resource strain: Not very hard    Food insecurity     Worry: Never true     Inability: Never true    Transportation needs     Medical: No     Non-medical: No   Tobacco Use    Smoking status: Never Smoker    Smokeless tobacco: Never Used   Substance and Sexual Activity    Alcohol use: Yes     Comment: social    Drug use: No    Sexual activity: Not on file   Lifestyle    Physical activity     Days per week: 0 days     Minutes per session: 0 min    Stress:  Only a little   Relationships    Social connections     Talks on phone: Not on file     Gets together: Not on file     Attends Muslim service: Not on file     Active member of club or organization: Not on file     Attends meetings of clubs or organizations: Not on file     Relationship status: Not on file    Intimate partner violence     Fear of current or ex partner: Not on file     Emotionally abused: Not on file     Physically abused: Not on file     Forced sexual activity: Not on file   Other Topics Concern     Service Not Asked    Blood Transfusions Not Asked    Caffeine Concern Not Asked    Occupational Exposure Not Asked   Sin Daniel Hazards Not Asked    Sleep Concern Not Asked    Stress Concern Not Asked    Weight Concern Yes    Special Diet Not Asked    Back Care Not Asked    Exercise Not Asked    Bike Helmet Not Asked    Seat Belt Not Asked    Self-Exams Not Asked   Social History Narrative    Not on file       Review of Systems   Review of Systems   HENT: Negative for congestion. Respiratory: Negative for cough. Cardiovascular: Negative for chest pain and palpitations. Objective:     Visit Vitals  /82 (BP 1 Location: Left arm, BP Patient Position: Sitting)   Pulse 60   Temp 99.3 °F (37.4 °C) (Temporal)   Resp 18   Ht 5' 10\" (1.778 m)   Wt 261 lb 6.4 oz (118.6 kg)   SpO2 97%   BMI 37.51 kg/m²        Physical Exam  Vitals signs and nursing note reviewed. Constitutional:       Appearance: Normal appearance. HENT:      Head: Normocephalic and atraumatic. Neck:      Musculoskeletal: Normal range of motion and neck supple. Cardiovascular:      Rate and Rhythm: Normal rate and regular rhythm. Pulses: Normal pulses. Heart sounds: Normal heart sounds. No murmur. No friction rub. No gallop. Pulmonary:      Effort: Pulmonary effort is normal.      Breath sounds: Normal breath sounds. Abdominal:      General: Abdomen is flat. Bowel sounds are normal.      Palpations: Abdomen is soft. Neurological:      Mental Status: He is alert. Pertinent Labs/Studies:      Assessment and orders:       ICD-10-CM ICD-9-CM    1. Essential hypertension  I10 401.9      Diagnoses and all orders for this visit:    1. Essential hypertension: Condition and symptoms are well controlled at this time. No changes to therapy at this time. Our goal is to normalize the blood pressure to decrease the risks of strokes and heart attacks. The patient is in agreement with the plan. Follow-up and Dispositions    · Return in about 6 months (around 2/21/2021). I have discussed the diagnosis with the patient and the intended plan as seen in the above orders. Social history, medical history, and labs were reviewed.   The patient has received an after-visit summary and questions were answered concerning future plans.  I have discussed medication side effects and warnings with the patient as well.     MD RENA Mckay & ALEXANDRA CHOU [facility]  08/21/20

## 2020-08-21 NOTE — PROGRESS NOTES
Chief Complaint   Patient presents with    Blood Pressure Check     Visit Vitals  /82 (BP 1 Location: Left arm, BP Patient Position: Sitting)   Pulse 60   Temp 99.3 °F (37.4 °C) (Temporal)   Resp 18   Ht 5' 10\" (1.778 m)   Wt 261 lb 6.4 oz (118.6 kg)   SpO2 97%   BMI 37.51 kg/m²     1. Have you been to the ER, urgent care clinic since your last visit? Hospitalized since your last visit? No    2. Have you seen or consulted any other health care providers outside of the [address] [place] [address] since your last visit? Include any pap smears or colon screening.  No    Reviewed record in preparation for visit and have necessary documentation  Pt did not bring medication to office visit for review  opportunity was given for questions  Goals that were addressed and/or need to be completed during or after this appointment include   Health Maintenance Due   Topic Date Due    Shingrix Vaccine Age 50> (1 of 2) 03/19/2019

## 2020-10-01 ENCOUNTER — VIRTUAL VISIT (OUTPATIENT)
Dept: FAMILY MEDICINE CLINIC | Age: 50
End: 2020-10-01
Payer: COMMERCIAL

## 2020-10-01 DIAGNOSIS — I10 ESSENTIAL HYPERTENSION: Chronic | ICD-10-CM

## 2020-10-01 DIAGNOSIS — J41.0 SIMPLE CHRONIC BRONCHITIS (HCC): ICD-10-CM

## 2020-10-01 DIAGNOSIS — E78.2 MIXED HYPERLIPIDEMIA: Chronic | ICD-10-CM

## 2020-10-01 DIAGNOSIS — E66.01 OBESITY, MORBID (HCC): ICD-10-CM

## 2020-10-01 DIAGNOSIS — M54.50 ACUTE MIDLINE LOW BACK PAIN WITHOUT SCIATICA: Primary | ICD-10-CM

## 2020-10-01 DIAGNOSIS — Z12.5 SCREENING FOR PROSTATE CANCER: ICD-10-CM

## 2020-10-01 PROCEDURE — 99442 PR PHYS/QHP TELEPHONE EVALUATION 11-20 MIN: CPT | Performed by: FAMILY MEDICINE

## 2020-10-01 RX ORDER — PREDNISONE 20 MG/1
40 TABLET ORAL
Qty: 10 TAB | Refills: 0 | Status: SHIPPED | OUTPATIENT
Start: 2020-10-01 | End: 2020-10-06

## 2020-10-01 NOTE — PROGRESS NOTES
Sybil Singer is a 45 y.o. male evaluated via Telephone on 10/01/20. Patient Identity confirmed by . Consent:  He and/or health care decision maker is aware that that he may receive a bill for this telephone service, depending on his insurance coverage, and has provided verbal consent to proceed: Yes    Physician Location: Office  Patient Location: Home  Nurse Assisting with Encounter: Gabby Garza LPN    Chief Complaint   Patient presents with    Back Pain    Other     ortho referral      Information gathered from patient and/or health care decision maker. HPI:   Back Pain:  Patient reports Moderate to severe Left low back pain that has been ongoing for the last 1 months. Patient does have a history of chronic back pain that has been very intermittent. Reports pain is related to no known injury recently, but does have a remote work injury. Just noting increasing pain, history of known spinal Stenosis/DDD  - Quality: sharp pain of 5-10/10 intensity at baseline.   - Frequency: every day  - Duration: continuous  - Radiation: Left Leg  - Numbness/Decreased sensation: No  - Pain Exacerbations: sharp pain of 10/10 intensity lasting for second(s). - Exacerbating factors: Walking  - Alleviating Factors: recumbency  - Current Medications tried: NSAID: Motrin  - Previous Back Pain: recurrent self limited episodes of low back pain in the past  - Previous Orthopedic Surgeon: Dr. Shirin Lee  - Previous Physical Therapy: Previously  - Previous imaging: Has had CT, MRI, and XR in the past in [place]  - Patient denies fevers, chills, sweats, weight loss, bowel/bladder incontinence, saddle anesthesia. Chronic Bronchitis: Doing well. Obesity: Is watching diet and increasing exericse    Review of Systems   Constitutional: Negative for chills and fever. HENT: Negative for congestion. Respiratory: Negative for cough. Cardiovascular: Negative for chest pain and palpitations.    Gastrointestinal: Negative for abdominal pain, nausea and vomiting. Musculoskeletal: Positive for back pain. Limited Exam:  Due to this being a TeleHealth evaluation, many elements of the physical examination are unable to be assessed. Constitutional: Appears well-developed and well-nourished in no apparent distress   Mental status: Alert and awake, Oriented to person/place/time, Able to follow commands  Psychiatric: Normal affect, normal judgment/insight. No hallucinations     Current Outpatient Medications on File Prior to Visit   Medication Sig Dispense Refill    lisinopriL (PRINIVIL, ZESTRIL) 20 mg tablet Take 1 Tab by mouth daily. 90 Tab 3    omeprazole (PRILOSEC) 40 mg capsule Take 1 Cap by mouth daily. 90 Cap 3    tadalafiL (CIALIS) 5 mg tablet Take 1 Tab by mouth daily. 90 Tab 3    metFORMIN ER (GLUCOPHAGE XR) 500 mg tablet Take 1 Tab by mouth daily (with dinner). 90 Tab 1    ezetimibe-simvastatin (Vytorin 10/20) 10-20 mg per tablet Take 1 Tab by mouth daily. 90 Tab 3    desloratadine (CLARINEX) 5 mg tablet Take 1 Tab by mouth daily. 90 Tab 3    ibuprofen (MOTRIN) 800 mg tablet Take 1 Tab by mouth every eight (8) hours as needed for Pain. 180 Tab 3    meloxicam (MOBIC) 15 mg tablet Take 1 Tab by mouth daily. 30 Tab 1    cyclobenzaprine (FLEXERIL) 10 mg tablet Take 1 Tab by mouth three (3) times daily as needed for Muscle Spasm(s). 60 Tab 2    chlorhexidine (PERIDEX) 0.12 % solution RINSE MOUTH WITH 15 ML FOR 30 SEC AND SPIT. USE EVERY 12 HOURS AFTER BRUSHING AND FLOSSING 473 mL 5     No current facility-administered medications on file prior to visit.          No Known Allergies     Patient Active Problem List    Diagnosis Date Noted    Simple chronic bronchitis ([facility] [address] 12/06/2019    Prediabetes 12/06/2019    Serum calcium elevated 06/10/2019    Obesity, morbid ([facility] [address] 05/25/2018    ED (erectile dysfunction) of organic origin 04/21/2016    GERD (gastroesophageal reflux disease) 04/21/2016    Hyperlipidemia 01/16/2013    HTN (hypertension) 11/19/2010    History of herniated intervertebral disc 05/05/2010    Environmental allergies 05/05/2010        Health Maintenance Due   Topic Date Due    Shingrix Vaccine Age 50> (1 of 2) 03/19/2019        Assessment/Plan:  Details of this discussion including any medical advice provided: Trial of Prednisone for back pain and referral.  Labs ordered to be done in 3 months. ICD-10-CM ICD-9-CM    1. Acute midline low back pain without sciatica  M54.5 724.2 predniSONE (DELTASONE) 20 mg tablet      REFERRAL TO ORTHOPEDICS   2. Simple chronic bronchitis (HCC)  J41.0 491.0    3. Obesity, morbid ([facility] [address]  E66.01 278.01    4. Essential hypertension  L08 809.1 METABOLIC PANEL, COMPREHENSIVE      CBC W/O DIFF   5. Mixed hyperlipidemia  E78.2 272.2 LIPID PANEL   6. Screening for prostate cancer  Z12.5 V76.44 PSA W/ REFLX FREE PSA       Total Time: minutes: 11-20 minutes was spent on telemedicine encounter discussing above problems and plans. Patient Problem list, medications, and Allergies were reviewed during this encounter. Pursuant to the emergency declaration under the [facility] waiver authority and the [employer] and [employer] Act, this Telephone Visit was conducted, with patient's consent, to reduce the patient's risk of exposure to COVID-19 and provide continuity of care for an established patient. I affirm this is a Patient Initiated Episode with an Established Patient who has not had a related appointment within my department in the past 7 days or scheduled within the next 24 hours. Discussed diagnoses in detail with patient. Medication risks/benefits/side effects discussed with patient. All of the patient's questions were addressed. The patient understands and agrees with our plan of care.   The patient knows to call back if they are unsure of or forget any changes we discussed today or if the symptoms change.     Note: not billable if this call serves to triage the patient into an appointment for the relevant concern    MD RENA Johnson & ALEXANDRA CHOU [facility]  10/01/20

## 2021-01-07 DIAGNOSIS — R73.01 IMPAIRED FASTING GLUCOSE: ICD-10-CM

## 2021-01-07 RX ORDER — METFORMIN HYDROCHLORIDE 500 MG/1
TABLET, EXTENDED RELEASE ORAL
Qty: 90 TAB | Refills: 3 | Status: SHIPPED | OUTPATIENT
Start: 2021-01-07 | End: 2022-01-27 | Stop reason: SDUPTHER

## 2021-04-05 RX ORDER — CHLORHEXIDINE GLUCONATE 1.2 MG/ML
RINSE ORAL
Qty: 473 ML | Refills: 21 | Status: SHIPPED | OUTPATIENT
Start: 2021-04-05 | End: 2022-08-07

## 2021-06-23 ENCOUNTER — OFFICE VISIT (OUTPATIENT)
Dept: FAMILY MEDICINE CLINIC | Age: 51
End: 2021-06-23
Payer: COMMERCIAL

## 2021-06-23 VITALS
WEIGHT: 272 LBS | BODY MASS INDEX: 38.94 KG/M2 | SYSTOLIC BLOOD PRESSURE: 134 MMHG | HEIGHT: 70 IN | RESPIRATION RATE: 20 BRPM | HEART RATE: 63 BPM | DIASTOLIC BLOOD PRESSURE: 86 MMHG | TEMPERATURE: 98.6 F | OXYGEN SATURATION: 95 %

## 2021-06-23 DIAGNOSIS — N52.9 ERECTILE DYSFUNCTION, UNSPECIFIED ERECTILE DYSFUNCTION TYPE: ICD-10-CM

## 2021-06-23 DIAGNOSIS — Z12.5 SCREENING FOR PROSTATE CANCER: ICD-10-CM

## 2021-06-23 DIAGNOSIS — M25.512 ACUTE PAIN OF LEFT SHOULDER: ICD-10-CM

## 2021-06-23 DIAGNOSIS — Z88.9 H/O SEASONAL ALLERGIES: ICD-10-CM

## 2021-06-23 DIAGNOSIS — I10 ESSENTIAL HYPERTENSION: Primary | ICD-10-CM

## 2021-06-23 DIAGNOSIS — R73.03 PREDIABETES: ICD-10-CM

## 2021-06-23 DIAGNOSIS — Z11.59 NEED FOR HEPATITIS C SCREENING TEST: ICD-10-CM

## 2021-06-23 DIAGNOSIS — K21.9 GASTROESOPHAGEAL REFLUX DISEASE WITHOUT ESOPHAGITIS: ICD-10-CM

## 2021-06-23 DIAGNOSIS — E78.2 MIXED HYPERLIPIDEMIA: ICD-10-CM

## 2021-06-23 DIAGNOSIS — Z87.39 HISTORY OF HERNIATED INTERVERTEBRAL DISC: ICD-10-CM

## 2021-06-23 PROBLEM — J41.0 SIMPLE CHRONIC BRONCHITIS (HCC): Chronic | Status: RESOLVED | Noted: 2019-12-06 | Resolved: 2021-06-23

## 2021-06-23 PROCEDURE — 99214 OFFICE O/P EST MOD 30 MIN: CPT | Performed by: FAMILY MEDICINE

## 2021-06-23 RX ORDER — CYCLOBENZAPRINE HCL 10 MG
10 TABLET ORAL
Qty: 90 TABLET | Refills: 3 | Status: SHIPPED | OUTPATIENT
Start: 2021-06-23 | End: 2022-01-27 | Stop reason: SDUPTHER

## 2021-06-23 RX ORDER — LISINOPRIL 20 MG/1
20 TABLET ORAL DAILY
Qty: 90 TABLET | Refills: 3 | Status: SHIPPED | OUTPATIENT
Start: 2021-06-23 | End: 2022-06-07 | Stop reason: SDUPTHER

## 2021-06-23 RX ORDER — TADALAFIL 5 MG/1
5 TABLET ORAL DAILY
Qty: 90 TABLET | Refills: 3 | Status: SHIPPED | OUTPATIENT
Start: 2021-06-23 | End: 2022-01-27 | Stop reason: SDUPTHER

## 2021-06-23 RX ORDER — MELOXICAM 15 MG/1
15 TABLET ORAL DAILY
Qty: 90 TABLET | Refills: 1 | Status: SHIPPED | OUTPATIENT
Start: 2021-06-23 | End: 2022-01-27

## 2021-06-23 RX ORDER — OMEPRAZOLE 40 MG/1
40 CAPSULE, DELAYED RELEASE ORAL DAILY
Qty: 90 CAPSULE | Refills: 3 | Status: SHIPPED | OUTPATIENT
Start: 2021-06-23 | End: 2022-01-27 | Stop reason: SDUPTHER

## 2021-06-23 RX ORDER — IBUPROFEN 800 MG/1
800 TABLET ORAL
Qty: 180 TABLET | Refills: 3 | Status: SHIPPED | OUTPATIENT
Start: 2021-06-23 | End: 2022-06-07 | Stop reason: SDUPTHER

## 2021-06-23 RX ORDER — EZETIMIBE AND SIMVASTATIN 10; 20 MG/1; MG/1
1 TABLET ORAL DAILY
Qty: 90 TABLET | Refills: 3 | Status: SHIPPED | OUTPATIENT
Start: 2021-06-23 | End: 2022-06-07 | Stop reason: SDUPTHER

## 2021-06-23 RX ORDER — DESLORATADINE 5 MG/1
5 TABLET ORAL DAILY
Qty: 90 TABLET | Refills: 3 | Status: SHIPPED | OUTPATIENT
Start: 2021-06-23 | End: 2022-06-07 | Stop reason: SDUPTHER

## 2021-06-23 RX ORDER — TIZANIDINE 4 MG/1
4 TABLET ORAL
COMMUNITY
Start: 2020-11-13 | End: 2021-06-23

## 2021-06-23 NOTE — PATIENT INSTRUCTIONS
Rosi Euceda with [facility]  [address] [place] [address], [place] [address] [place], [facility]  [phone]    Monitor blood pressure outside the office several times weekly at different times during the day and evening. Bring the record to me in 3 weeks for review. Blood Pressure Record     Patient Name:  ______________________ :  ______________________    Date/Time BP Reading Pulse                                                                                                                                                                                                Home Blood Pressure Test: About This Test  What is it? A home blood pressure test allows you to keep track of your blood pressure at home. Blood pressure is a measure of the force of blood against the walls of your arteries. Blood pressure readings include two numbers, such as 130/80 (say \"130 over 80\"). The first number is the systolic pressure. The second number is the diastolic pressure. Why is this test done? You may do this test at home to:  · Find out if you have high blood pressure. · Track your blood pressure if you have high blood pressure. · Track how well medicine is working to reduce high blood pressure. · Check how lifestyle changes, such as weight loss and exercise, are affecting blood pressure. How do you prepare for the test?  For at least 30 minutes before you take your blood pressure, don't exercise, drink caffeine, or smoke. Empty your bladder before the test. Sit quietly with your back straight and both feet on the floor for at least 5 minutes. This helps you take your blood pressure while you feel comfortable and relaxed. How is the test done? · If your doctor recommends it, take your blood pressure twice a day. Take it in the morning and evening. · Sit with your arm slightly bent and resting on a table so that your upper arm is at the same level as your heart.   · Use the same arm each time you take your blood pressure. · Place the blood pressure cuff on the bare skin of your upper arm. You may have to roll up your sleeve, remove your arm from the sleeve, or take your shirt off. · Wrap the blood pressure cuff around your upper arm so that the lower edge of the cuff is about 1 inch above the bend of your elbow. · Do not move, talk, or text while you take your blood pressure. Follow the instructions that came with your blood pressure monitor. They might be different from the following. · Press the on/off button on the automatic monitor. Then you may need to wait until the screen says the monitor is ready. · Press the start button. The cuff will inflate and deflate by itself. · Your blood pressure numbers will appear on the screen. · Wait one minute and take your blood pressure again. · If your monitor does not automatically save your numbers, write them in your log book, along with the date and time. Follow-up care is a key part of your treatment and safety. Be sure to make and go to all appointments, and call your doctor if you are having problems. It's also a good idea to keep a list of the medicines you take. Where can you learn more? Go to http://www.[employer].com/  Enter C427 in the search box to learn more about \"Home Blood Pressure Test: About This Test.\"  Current as of: August 31, 2020               Content Version: 12.8  © 4455-7828 [employer]. Care instructions adapted under license by [employer] (which disclaims liability or warranty for this information). If you have questions about a medical condition or this instruction, always ask your healthcare professional. [address] any warranty or liability for your use of this information.

## 2021-06-23 NOTE — PROGRESS NOTES
[facility]    History of Present Illness:   Shraddha Becker is a 45 y.o. male with history of HTN, GERD, ED, Obesity, Allergies  CC: Follow up chronic Conditions  History provided by patient and Records    HPI:  Hypertension Follow up:  Currently Taking Lisinopril 20 mg. The patient reports:  taking medications as instructed, no medication side effects noted, no TIA's, no chest pain on exertion, no dyspnea on exertion, no swelling of ankles, no orthostatic dizziness or lightheadedness, no orthopnea or paroxysmal nocturnal dyspnea. BP Readings from Last 3 Encounters:   06/23/21 (!) 149/97   08/21/20 121/82   06/17/20 (!) 139/93      Gastroesophageal Reflux:  Current control of Symptoms: Controlled  Primary symptoms: heartburn  Hiatal Hernia: No  Current Medications: Prilosec  The patient has no history melena or bright red blood in the stools. The patient avoids high dose aspirin and NSAID therapy. The patient is aware of diet changes needed, elevating the head of the bed and appropriate use of antacids. Prediabetes:  Patient reports good energy levels and doing well overall.  - Polydipsia?: No  - Polyphagia?: No  - Polyuria?: No  - Exercise: SOme exercise  - Diet: Avoiding Carbs  - Current related medications: None  - Weight loss since last visit?: No     Wt Readings from Last 3 Encounters:   06/23/21 272 lb (123.4 kg)   08/21/20 261 lb 6.4 oz (118.6 kg)   06/17/20 262 lb 3.2 oz (118.9 kg)      Hypertriglyceridemia Follow up:   Cardiovascular risks for him are: hypertension  hyperlipidemia. Currently he takes Ezetimibe-simvastatin, 10-20 mg. Myalgias: NO   Fatigue: NO   Other side effects: NO     Wt Readings from Last 3 Encounters:   06/23/21 272 lb (123.4 kg)   08/21/20 261 lb 6.4 oz (118.6 kg)   06/17/20 262 lb 3.2 oz (118.9 kg)     Low Back Pain: Improvement significantly at this time with exercises. Mild right leg pain radiating.   Meloxicam and Flexeril    ED: Controlled on [facility] Maintenance  Health Maintenance Due   Topic Date Due    Hepatitis C Screening  Never done    Shingrix Vaccine Age 50> (1 of 2) Never done    A1C test (Diabetic or Prediabetic)  06/17/2021    Lipid Screen  06/17/2021       Past Medical, Family, and Social History:     Current Outpatient Medications on File Prior to Visit   Medication Sig Dispense Refill    chlorhexidine (PERIDEX) 0.12 % solution USE 1 CAP. FILL TO LINE (15 ML) RINSE MOUTH FOR 30 SECONDS AND SPIT, USE EVERY 12 HOURS AFTER BRUSHING AND FLOSSING 473 mL 21    metFORMIN ER (GLUCOPHAGE XR) 500 mg tablet TAKE 1 TABLET DAILY WITH DINNER 90 Tab 3    [DISCONTINUED] tiZANidine (ZANAFLEX) 4 mg tablet Take 4 mg by mouth every eight (8) hours as needed. (Patient not taking: Reported on 6/23/2021)      [DISCONTINUED] lisinopriL (PRINIVIL, ZESTRIL) 20 mg tablet Take 1 Tab by mouth daily. 90 Tab 3    [DISCONTINUED] omeprazole (PRILOSEC) 40 mg capsule Take 1 Cap by mouth daily. 90 Cap 3    [DISCONTINUED] tadalafiL (CIALIS) 5 mg tablet Take 1 Tab by mouth daily. 90 Tab 3    [DISCONTINUED] ezetimibe-simvastatin (Vytorin 10/20) 10-20 mg per tablet Take 1 Tab by mouth daily. 90 Tab 3    [DISCONTINUED] desloratadine (CLARINEX) 5 mg tablet Take 1 Tab by mouth daily. 90 Tab 3    [DISCONTINUED] ibuprofen (MOTRIN) 800 mg tablet Take 1 Tab by mouth every eight (8) hours as needed for Pain. 180 Tab 3    [DISCONTINUED] meloxicam (MOBIC) 15 mg tablet Take 1 Tab by mouth daily. 30 Tab 1    [DISCONTINUED] cyclobenzaprine (FLEXERIL) 10 mg tablet Take 1 Tab by mouth three (3) times daily as needed for Muscle Spasm(s). 60 Tab 2     No current facility-administered medications on file prior to visit.        Patient Active Problem List   Diagnosis Code    History of herniated intervertebral disc Z87.39    Environmental allergies Z91.09    HTN (hypertension) I10    Hyperlipidemia E78.5    ED (erectile dysfunction) of organic origin N52.9  GERD (gastroesophageal reflux disease) K21.9    Obesity, morbid ([facility]) E66.01    Serum calcium elevated E83.52    Simple chronic bronchitis ([facility]) J41.0    Prediabetes R73.03       Social History     Socioeconomic History    Marital status:      Spouse name: Not on file    Number of children: Not on file    Years of education: Not on file    Highest education level: Not on file   Occupational History    Not on file   Tobacco Use    Smoking status: Never Smoker    Smokeless tobacco: Never Used   Substance and Sexual Activity    Alcohol use: Yes     Comment: social    Drug use: No    Sexual activity: Not on file   Other Topics Concern     Service Not Asked    Blood Transfusions Not Asked    Caffeine Concern Not Asked    Occupational Exposure Not Asked    Hobby Hazards Not Asked    Sleep Concern Not Asked    Stress Concern Not Asked    Weight Concern Yes    Special Diet Not Asked    Back Care Not Asked    Exercise Not Asked    Bike Helmet Not Asked   [address] [facility] Not Asked    Self-Exams Not Asked   Social History Narrative    Not on file     Social Determinants of Health     Financial Resource Strain:     Difficulty of Paying Living Expenses:    Food Insecurity:     Worried About Running Out of Food in the Last Year:     Ran Out of Food in the Last Year:    Transportation Needs:     Lack of Transportation (Medical):      Lack of Transportation (Non-Medical):    Physical Activity:     Days of Exercise per Week:     Minutes of Exercise per Session:    Stress:     Feeling of Stress :    Social Connections:     Frequency of Communication with Friends and Family:     Frequency of Social Gatherings with Friends and Family:     Attends Faith Services:     Active Member of Clubs or Organizations:     Attends Club or Organization Meetings:     Marital Status:    Intimate Partner Violence:     Fear of Current or Ex-Partner:     Emotionally Abused:     Physically Abused:     Sexually Abused:        Review of Systems   Review of Systems   Constitutional: Negative for chills and fever. HENT: Negative for congestion. Respiratory: Negative for cough. Cardiovascular: Negative for chest pain and palpitations. Gastrointestinal: Negative for abdominal pain, nausea and vomiting. Neurological: Negative for dizziness and headaches. Objective:     Visit Vitals  BP (!) 149/97   Pulse 63   Temp 98.6 °F (37 °C) (Oral)   Resp 20   Ht 5' 10\" (1.778 m)   Wt 272 lb (123.4 kg)   SpO2 95%   BMI 39.03 kg/m²        Physical Exam  Vitals and nursing note reviewed. Constitutional:       Appearance: Normal appearance. HENT:      Head: Normocephalic and atraumatic. Cardiovascular:      Rate and Rhythm: Normal rate and regular rhythm. Pulses: Normal pulses. Heart sounds: Normal heart sounds. No murmur heard. No friction rub. No gallop. Pulmonary:      Effort: Pulmonary effort is normal.      Breath sounds: Normal breath sounds. Abdominal:      General: Abdomen is flat. Bowel sounds are normal.      Palpations: Abdomen is soft. Musculoskeletal:         General: Normal range of motion. Cervical back: Normal range of motion and neck supple. Right lower leg: No edema. Left lower leg: No edema. Skin:     General: Skin is warm and dry. Neurological:      Mental Status: He is alert. Pertinent Labs/Studies:      Assessment and orders:       ICD-10-CM ICD-9-CM    1. Essential hypertension  I10 401.9 lisinopriL (PRINIVIL, ZESTRIL) 20 mg tablet      CBC W/O DIFF      METABOLIC PANEL, COMPREHENSIVE      METABOLIC PANEL, COMPREHENSIVE      CBC W/O DIFF   2. Gastroesophageal reflux disease without esophagitis  K21.9 530.81 omeprazole (PRILOSEC) 40 mg capsule   3. Erectile dysfunction, unspecified erectile dysfunction type  N52.9 607.84 tadalafiL (CIALIS) 5 mg tablet   4.  Mixed hyperlipidemia  E78.2 272.2 ezetimibe-simvastatin (Vytorin 10/20) 10-20 mg per tablet      LIPID PANEL      LIPID PANEL   5. H/O seasonal allergies  Z88.9 V15.09 desloratadine (CLARINEX) 5 mg tablet   6. History of herniated intervertebral disc  Z87.39 V13.59 ibuprofen (MOTRIN) 800 mg tablet      cyclobenzaprine (FLEXERIL) 10 mg tablet   7. Acute pain of left shoulder  M25.512 719.41 meloxicam (MOBIC) 15 mg tablet   8. Prediabetes  R73.03 790.29 HEMOGLOBIN A1C WITH EAG      HEMOGLOBIN A1C WITH EAG   9. Screening for prostate cancer  Z12.5 V76.44 PSA W/ REFLX FREE PSA      PSA W/ REFLX FREE PSA   10. Need for hepatitis C screening test  Z11.59 V73.89 HEPATITIS C AB      HEPATITIS C AB     Diagnoses and all orders for this visit:    1. Essential hypertension: Our goal is to normalize the blood pressure to decrease the risks of strokes and heart attacks. The patient is in agreement with the plan. Advised the patient to meassure blood pressure at home and to bring logs at the next visit or return logs in the next 2 weeks for review. Will follow up closely and determine if  increase of medication required base on readings. -     lisinopriL (PRINIVIL, ZESTRIL) 20 mg tablet; Take 1 Tablet by mouth daily.  -     CBC W/O DIFF; Future  -     METABOLIC PANEL, COMPREHENSIVE; Future    2. Gastroesophageal reflux disease without esophagitis: Refill  -     omeprazole (PRILOSEC) 40 mg capsule; Take 1 Capsule by mouth daily. 3. Erectile dysfunction, unspecified erectile dysfunction type: Refill  -     tadalafiL (CIALIS) 5 mg tablet; Take 1 Tablet by mouth daily. 4. Mixed hyperlipidemia: The patient is aware of our goal to reduce or eliminate the long term problems (such as strokes and heart attacks) related to poorly controlled Triglycerides, LDL, Cholesterol.   -     ezetimibe-simvastatin (Vytorin 10/20) 10-20 mg per tablet; Take 1 Tablet by mouth daily.  -     LIPID PANEL; Future    5. H/O seasonal allergies  -     desloratadine (CLARINEX) 5 mg tablet; Take 1 Tablet by mouth daily.     6. History of herniated intervertebral disc  -     ibuprofen (MOTRIN) 800 mg tablet; Take 1 Tablet by mouth every eight (8) hours as needed for Pain. -     cyclobenzaprine (FLEXERIL) 10 mg tablet; Take 1 Tablet by mouth three (3) times daily as needed for Muscle Spasm(s). 7. Acute pain of left shoulder  -     meloxicam (MOBIC) 15 mg tablet; Take 1 Tablet by mouth daily. 8. Prediabetes  -     HEMOGLOBIN A1C WITH EAG; Future    9. Screening for prostate cancer  -     PSA W/ REFLX FREE PSA; Future    10. Need for hepatitis C screening test  -     HEPATITIS C AB; Future      Follow-up and Dispositions    · Return in about 2 weeks (around 7/7/2021) for Follow up HTN. I have discussed the diagnosis with the patient and the intended plan as seen in the above orders. Social history, medical history, and labs were reviewed. The patient has received an after-visit summary and questions were answered concerning future plans. I have discussed medication side effects and warnings with the patient as well.     MD RENA Rivera & ALEXANDRA CHOU [facility]  06/23/21

## 2021-06-23 NOTE — PROGRESS NOTES
1. Have you been to the ER, urgent care clinic since your last visit? Hospitalized since your last visit? No    2. Have you seen or consulted any other health care providers outside of the [address] [place] [address] since your last visit? Include any pap smears or colon screening.  No  Reviewed record in preparation for visit and have necessary documentation  Pt did not bring medication to office visit for review    Goals that were addressed and/or need to be completed during or after this appointment include   Health Maintenance Due   Topic Date Due    Hepatitis C Screening  Never done    COVID-19 Vaccine (1) Never done    Shingrix Vaccine Age 50> (1 of 2) Never done    A1C test (Diabetic or Prediabetic)  06/17/2021    Lipid Screen  06/17/2021     Declines shingles vaccine

## 2021-06-24 LAB
ALBUMIN SERPL-MCNC: 4.1 G/DL (ref 3.5–5)
ALBUMIN/GLOB SERPL: 1.1 {RATIO} (ref 1.1–2.2)
ALP SERPL-CCNC: 48 U/L (ref 45–117)
ALT SERPL-CCNC: 37 U/L (ref 12–78)
ANION GAP SERPL CALC-SCNC: 7 MMOL/L (ref 5–15)
AST SERPL-CCNC: 24 U/L (ref 15–37)
BILIRUB SERPL-MCNC: 0.8 MG/DL (ref 0.2–1)
BUN SERPL-MCNC: 11 MG/DL (ref 6–20)
BUN/CREAT SERPL: 11 (ref 12–20)
CALCIUM SERPL-MCNC: 10 MG/DL (ref 8.5–10.1)
CHLORIDE SERPL-SCNC: 105 MMOL/L (ref 97–108)
CHOLEST SERPL-MCNC: 144 MG/DL
CO2 SERPL-SCNC: 28 MMOL/L (ref 21–32)
CREAT SERPL-MCNC: 0.96 MG/DL (ref 0.7–1.3)
ERYTHROCYTE [DISTWIDTH] IN BLOOD BY AUTOMATED COUNT: 13.4 % (ref 11.5–14.5)
EST. AVERAGE GLUCOSE BLD GHB EST-MCNC: 108 MG/DL
GLOBULIN SER CALC-MCNC: 3.7 G/DL (ref 2–4)
GLUCOSE SERPL-MCNC: 84 MG/DL (ref 65–100)
HBA1C MFR BLD: 5.4 % (ref 4–5.6)
HCT VFR BLD AUTO: 45.5 % (ref 36.6–50.3)
HCV AB SERPL QL IA: NONREACTIVE
HCV COMMENT,HCGAC: NORMAL
HDLC SERPL-MCNC: 41 MG/DL
HDLC SERPL: 3.5 {RATIO} (ref 0–5)
HGB BLD-MCNC: 14.5 G/DL (ref 12.1–17)
LDLC SERPL CALC-MCNC: 69.4 MG/DL (ref 0–100)
MCH RBC QN AUTO: 29.2 PG (ref 26–34)
MCHC RBC AUTO-ENTMCNC: 31.9 G/DL (ref 30–36.5)
MCV RBC AUTO: 91.7 FL (ref 80–99)
NRBC # BLD: 0 K/UL (ref 0–0.01)
NRBC BLD-RTO: 0 PER 100 WBC
PLATELET # BLD AUTO: 243 K/UL (ref 150–400)
PMV BLD AUTO: 11.4 FL (ref 8.9–12.9)
POTASSIUM SERPL-SCNC: 4.4 MMOL/L (ref 3.5–5.1)
PROT SERPL-MCNC: 7.8 G/DL (ref 6.4–8.2)
RBC # BLD AUTO: 4.96 M/UL (ref 4.1–5.7)
SODIUM SERPL-SCNC: 140 MMOL/L (ref 136–145)
TRIGL SERPL-MCNC: 168 MG/DL (ref ?–150)
VLDLC SERPL CALC-MCNC: 33.6 MG/DL
WBC # BLD AUTO: 5.1 K/UL (ref 4.1–11.1)

## 2021-06-25 LAB
PSA SERPL-MCNC: 1.5 NG/ML (ref 0–4)
REFLEX CRITERIA: NORMAL

## 2021-07-01 ENCOUNTER — TELEPHONE (OUTPATIENT)
Dept: FAMILY MEDICINE CLINIC | Age: 51
End: 2021-07-01

## 2021-07-01 NOTE — TELEPHONE ENCOUNTER
Pt is calling back regarding his [employer]. It was three papers. Dr. Geovani Knight was going to fax it over after the appointment. [employer] says they never received it. Please re-fax and call to let him know it was done again for the second to them.  Thanks

## 2021-07-02 NOTE — TELEPHONE ENCOUNTER
Called patient. He was advised [facility] paperwork refaxed to [facility]. He stated that he called them this morning and their fax machine has been broken. Problem: Discharge Barriers/Planning  Goal: Patient's continuum of care needs will be met  Outcome: PROGRESSING AS EXPECTED  Anticipate discharge tomorrow to [place]

## 2021-09-13 ENCOUNTER — TELEPHONE (OUTPATIENT)
Dept: FAMILY MEDICINE CLINIC | Age: 51
End: 2021-09-13

## 2021-09-13 NOTE — TELEPHONE ENCOUNTER
Called patient to schedule appointment for paperwork that was dropped off, patient unavailable, left message to return call.     Please schedule appointment for documentation

## 2021-09-17 ENCOUNTER — TELEPHONE (OUTPATIENT)
Dept: FAMILY MEDICINE CLINIC | Age: 51
End: 2021-09-17

## 2021-10-07 ENCOUNTER — OFFICE VISIT (OUTPATIENT)
Dept: FAMILY MEDICINE CLINIC | Age: 51
End: 2021-10-07
Payer: COMMERCIAL

## 2021-10-07 VITALS
TEMPERATURE: 98.5 F | WEIGHT: 270.6 LBS | BODY MASS INDEX: 38.74 KG/M2 | HEART RATE: 79 BPM | DIASTOLIC BLOOD PRESSURE: 84 MMHG | OXYGEN SATURATION: 96 % | SYSTOLIC BLOOD PRESSURE: 131 MMHG | RESPIRATION RATE: 20 BRPM | HEIGHT: 70 IN

## 2021-10-07 DIAGNOSIS — Z23 ENCOUNTER FOR IMMUNIZATION: Primary | ICD-10-CM

## 2021-10-07 PROCEDURE — 90471 IMMUNIZATION ADMIN: CPT | Performed by: FAMILY MEDICINE

## 2021-10-07 PROCEDURE — 90686 IIV4 VACC NO PRSV 0.5 ML IM: CPT | Performed by: FAMILY MEDICINE

## 2021-10-07 NOTE — PROGRESS NOTES
1. Have you been to the ER, urgent care clinic since your last visit? Hospitalized since your last visit? No    2. Have you seen or consulted any other health care providers outside of the [address] [place] [address] since your last visit? Include any pap smears or colon screening. No    Reviewed record in preparation for visit and have necessary documentation  Goals that were addressed and/or need to be completed during or after this appointment include     Health Maintenance Due   Topic Date Due    Shingrix Vaccine Age 49> (1 of 2) Never done       Patient is accompanied by self I have received verbal consent from Sara Boothe to discuss any/all medical information while they are present in the room.

## 2021-10-07 NOTE — PROGRESS NOTES
Did not evaluate patient directly. Patient tolerated injection well per nursing.     Rita Rodriguez MD  10/07/21

## 2021-12-08 ENCOUNTER — TELEPHONE (OUTPATIENT)
Dept: FAMILY MEDICINE CLINIC | Age: 51
End: 2021-12-08

## 2022-01-11 ENCOUNTER — TELEPHONE (OUTPATIENT)
Dept: FAMILY MEDICINE CLINIC | Age: 52
End: 2022-01-11

## 2022-01-11 NOTE — TELEPHONE ENCOUNTER
Called patient to follow up paperwork. Old paperwork found for FMLA for period of 9/21 through 9/22. Already have paperwork for 6/21-6/22 on media file though. Wanted to make sure something was not completed before shredding paperwork.     MD RENA Vigil & ALEXANDRA CHOU [facility]  01/11/22

## 2022-01-27 ENCOUNTER — OFFICE VISIT (OUTPATIENT)
Dept: FAMILY MEDICINE CLINIC | Age: 52
End: 2022-01-27
Payer: COMMERCIAL

## 2022-01-27 VITALS
BODY MASS INDEX: 38.8 KG/M2 | HEART RATE: 61 BPM | HEIGHT: 70 IN | TEMPERATURE: 97.3 F | DIASTOLIC BLOOD PRESSURE: 75 MMHG | WEIGHT: 271 LBS | SYSTOLIC BLOOD PRESSURE: 118 MMHG | OXYGEN SATURATION: 98 % | RESPIRATION RATE: 20 BRPM

## 2022-01-27 DIAGNOSIS — E78.2 MIXED HYPERLIPIDEMIA: ICD-10-CM

## 2022-01-27 DIAGNOSIS — K21.9 GASTROESOPHAGEAL REFLUX DISEASE WITHOUT ESOPHAGITIS: ICD-10-CM

## 2022-01-27 DIAGNOSIS — Z00.00 VISIT FOR WELL MAN HEALTH CHECK: Primary | ICD-10-CM

## 2022-01-27 DIAGNOSIS — R73.01 IMPAIRED FASTING GLUCOSE: ICD-10-CM

## 2022-01-27 DIAGNOSIS — N52.9 ERECTILE DYSFUNCTION, UNSPECIFIED ERECTILE DYSFUNCTION TYPE: ICD-10-CM

## 2022-01-27 DIAGNOSIS — Z87.39 HISTORY OF HERNIATED INTERVERTEBRAL DISC: ICD-10-CM

## 2022-01-27 DIAGNOSIS — I10 PRIMARY HYPERTENSION: ICD-10-CM

## 2022-01-27 PROCEDURE — 99396 PREV VISIT EST AGE 40-64: CPT | Performed by: FAMILY MEDICINE

## 2022-01-27 RX ORDER — CYCLOBENZAPRINE HCL 10 MG
10 TABLET ORAL
Qty: 90 TABLET | Refills: 3 | Status: SHIPPED | OUTPATIENT
Start: 2022-01-27 | End: 2022-06-07 | Stop reason: SDUPTHER

## 2022-01-27 RX ORDER — METFORMIN HYDROCHLORIDE 500 MG/1
TABLET, EXTENDED RELEASE ORAL
Qty: 90 TABLET | Refills: 3 | Status: SHIPPED | OUTPATIENT
Start: 2022-01-27 | End: 2022-06-07 | Stop reason: SDUPTHER

## 2022-01-27 RX ORDER — OMEPRAZOLE 40 MG/1
40 CAPSULE, DELAYED RELEASE ORAL DAILY
Qty: 90 CAPSULE | Refills: 3 | Status: SHIPPED | OUTPATIENT
Start: 2022-01-27 | End: 2022-06-07 | Stop reason: SDUPTHER

## 2022-01-27 RX ORDER — TADALAFIL 5 MG/1
5 TABLET ORAL DAILY
Qty: 90 TABLET | Refills: 3 | Status: SHIPPED | OUTPATIENT
Start: 2022-01-27 | End: 2022-06-07 | Stop reason: SDUPTHER

## 2022-01-27 NOTE — LETTER
NOTIFICATION RETURN TO WORK / SCHOOL    1/27/2022 9:05 AM    Mr. Daigle [id number]      To Whom It May Concern:    Odilia Maradiaga is currently under the care of [employer] [facility]. Please excuse any absence on 1/27/2022. If there are questions or concerns please have the patient contact our office.         Sincerely,      Curt Magallon MD

## 2022-01-27 NOTE — PROGRESS NOTES
1. Have you been to the ER, urgent care clinic since your last visit? Hospitalized since your last visit? No    2. Have you seen or consulted any other health care providers outside of the [facility] since your last visit? Include any pap smears or colon screening.  No  Reviewed record in preparation for visit and have necessary documentation  Pt did not bring medication to office visit for review    Goals that were addressed and/or need to be completed during or after this appointment include   Health Maintenance Due   Topic Date Due    Shingrix Vaccine Age 49> (1 of 2) Never done    COVID-19 Vaccine (3 - Booster for [place]ind[place] Peter series) 09/21/2021

## 2022-01-27 NOTE — PROGRESS NOTES
[facility]    History of Present Illness:   Odilia Maradiaga is a 45 y.o. male with history of  HTN, GERD, ED, Obesity, Allergies  CC: Well Male exam  History provided by patient and Records    HPI:    Well Male exam:  Odilia Maradiaga is a 45 y.o. Male presenting for well male exam.     How would you rate your health in generally over the last year? Good  Has your physical and emotional health limited your social activities with family or friends? no  Ability to perform activities of daily living? completes all daily living activities without any functional limitations    Current Complaints? Follow up and paperwork    Sexual History:  Sexually active: Yes  Type of sexual partners: Female    Prostate symptoms: None    Diet/Exercise History:  Diet: Favorite food Cheeseburgers. - Does patient eat at least 5 servings of Fruits/vegetables daily? No   - Is patient currently dieting? Yes    Exercise: Patient does not have structured exercise  - Does patient get 150 minutes of structured exercise weekly? 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 None    Healthcare maintenance:   Health Maintenance Due   Topic Date Due    Shingrix Vaccine Age 49> (1 of 2) Never done    COVID-19 Vaccine (3 - Booster for [employer] series) 09/21/2021     Colonoscopy indicated? No   DEXA scan indicated? No   HIV/STI testing indicated? No   Hepatitis C testing indicated? No   Lung cancer screening indicated? No   AAA screening indicated? No     Immunization History   Administered Date(s) Administered    COVID-19, Pfizer Purple top, DILUTE for use, 12+ yrs, 30mcg/0.3mL dose 03/24/2021, 04/21/2021    Influenza Vaccine 12/12/2012    Influenza Vaccine ([address] [place] [address] Road) PF (>6 Mo Flulaval, Fluarix, and >3 Yrs Afluria, Fluzone [address]17[address]) 10/17/2015, 09/22/2016, 09/27/2018, 10/16/2019, 10/07/2021    Tdap 05/20/2015     Flu indicated? No   Tdap indicated? No   Pneumovax indicated?  No Zostervax indicated? No   Meningococcal indicated? No       Medications Reviewed  Current Outpatient Medications on File Prior to Visit   Medication Sig Dispense Refill    lisinopriL (PRINIVIL, ZESTRIL) 20 mg tablet Take 1 Tablet by mouth daily. 90 Tablet 3    ezetimibe-simvastatin (Vytorin 10/20) 10-20 mg per tablet Take 1 Tablet by mouth daily. 90 Tablet 3    desloratadine (CLARINEX) 5 mg tablet Take 1 Tablet by mouth daily. 90 Tablet 3    ibuprofen (MOTRIN) 800 mg tablet Take 1 Tablet by mouth every eight (8) hours as needed for Pain. 180 Tablet 3    chlorhexidine (PERIDEX) 0.12 % solution USE 1 CAP. FILL TO LINE (15 ML) RINSE MOUTH FOR 30 SECONDS AND SPIT, USE EVERY 12 HOURS AFTER BRUSHING AND FLOSSING 473 mL 21    [DISCONTINUED] omeprazole (PRILOSEC) 40 mg capsule Take 1 Capsule by mouth daily. 90 Capsule 3    [DISCONTINUED] tadalafiL (CIALIS) 5 mg tablet Take 1 Tablet by mouth daily. 90 Tablet 3    [DISCONTINUED] meloxicam (MOBIC) 15 mg tablet Take 1 Tablet by mouth daily. (Patient not taking: Reported on 1/27/2022) 90 Tablet 1    [DISCONTINUED] cyclobenzaprine (FLEXERIL) 10 mg tablet Take 1 Tablet by mouth three (3) times daily as needed for Muscle Spasm(s). 90 Tablet 3    [DISCONTINUED] metFORMIN ER (GLUCOPHAGE XR) 500 mg tablet TAKE 1 TABLET DAILY WITH DINNER 90 Tab 3     No current facility-administered medications on file prior to visit. Past Medical, Family, and Social History: Allergies Reviewed:  No Known Allergies    Medical History Reviewed  Current Outpatient Medications on File Prior to Visit   Medication Sig Dispense Refill    lisinopriL (PRINIVIL, ZESTRIL) 20 mg tablet Take 1 Tablet by mouth daily. 90 Tablet 3    ezetimibe-simvastatin (Vytorin 10/20) 10-20 mg per tablet Take 1 Tablet by mouth daily. 90 Tablet 3    desloratadine (CLARINEX) 5 mg tablet Take 1 Tablet by mouth daily.  90 Tablet 3    ibuprofen (MOTRIN) 800 mg tablet Take 1 Tablet by mouth every eight (8) hours as needed for Pain. 180 Tablet 3    chlorhexidine (PERIDEX) 0.12 % solution USE 1 CAP. FILL TO LINE (15 ML) RINSE MOUTH FOR 30 SECONDS AND SPIT, USE EVERY 12 HOURS AFTER BRUSHING AND FLOSSING 473 mL 21    [DISCONTINUED] omeprazole (PRILOSEC) 40 mg capsule Take 1 Capsule by mouth daily. 90 Capsule 3    [DISCONTINUED] tadalafiL (CIALIS) 5 mg tablet Take 1 Tablet by mouth daily. 90 Tablet 3    [DISCONTINUED] meloxicam (MOBIC) 15 mg tablet Take 1 Tablet by mouth daily. (Patient not taking: Reported on 1/27/2022) 90 Tablet 1    [DISCONTINUED] cyclobenzaprine (FLEXERIL) 10 mg tablet Take 1 Tablet by mouth three (3) times daily as needed for Muscle Spasm(s). 90 Tablet 3    [DISCONTINUED] metFORMIN ER (GLUCOPHAGE XR) 500 mg tablet TAKE 1 TABLET DAILY WITH DINNER 90 Tab 3     No current facility-administered medications on file prior to visit. Surgical History Reviewed  No past surgical history on file.     Family History Reviewed  Family History   Problem Relation Age of Onset    Hypertension Mother     Coronary Art Dis Mother     Hypertension Father        Social History Reviewed  Social History     Socioeconomic History    Marital status:      Spouse name: Not on file    Number of children: Not on file    Years of education: Not on file    Highest education level: Not on file   Occupational History    Not on file   Tobacco Use    Smoking status: Never Smoker    Smokeless tobacco: Never Used   Substance and Sexual Activity    Alcohol use: Yes     Comment: social    Drug use: No    Sexual activity: Not on file   Other Topics Concern     Service Not Asked    Blood Transfusions Not Asked    Caffeine Concern Not Asked    Occupational Exposure Not Asked    Hobby Hazards Not Asked    Sleep Concern Not Asked    Stress Concern Not Asked    Weight Concern Yes    Special Diet Not Asked    Back Care Not Asked    Exercise Not Asked    Bike Helmet Not Asked   [address] Not Asked    Self-Exams Not Asked   Social History Narrative    Not on file     Social Determinants of Health     Financial Resource Strain:     Difficulty of Paying Living Expenses: Not on file   Food Insecurity:     Worried About Running Out of Food in the Last Year: Not on file    Artie of Food in the Last Year: Not on file   Transportation Needs:     Lack of Transportation (Medical): Not on file    Lack of Transportation (Non-Medical): Not on file   Physical Activity:     Days of Exercise per Week: Not on file    Minutes of Exercise per Session: Not on file   Stress:     Feeling of Stress : Not on file   Social Connections:     Frequency of Communication with Friends and Family: Not on file    Frequency of Social Gatherings with Friends and Family: Not on file    Attends Denominational Services: Not on file    Active Member of [address] [place] [address] or Organizations: Not on file    Attends Club or Organization Meetings: Not on file    Marital Status: Not on file   Intimate Partner Violence:     Fear of Current or Ex-Partner: Not on file    Emotionally Abused: Not on file    Physically Abused: Not on file    Sexually Abused: Not on file   Housing Stability:     Unable to Pay for Housing in the Last Year: Not on file    Number of [place] in the Last Year: Not on file    Unstable Housing in the Last Year: Not on file       Review of Systems   Review of Systems   Constitutional: Negative for chills and fever. Cardiovascular: Negative for chest pain and palpitations. Gastrointestinal: Negative for nausea and vomiting. Neurological: Negative for headaches. Objective:     Visit Vitals  /75   Pulse 61   Temp 97.3 °F (36.3 °C)   Resp 20   Ht 5' 10\" (1.778 m)   Wt 271 lb (122.9 kg)   SpO2 98%   BMI 38.88 kg/m²        Physical Exam  Vitals and nursing note reviewed. Constitutional:       Appearance: Normal appearance. HENT:      Head: Normocephalic and atraumatic.    Cardiovascular:      Rate and Rhythm: Normal rate and regular rhythm. Pulses: Normal pulses. Heart sounds: Normal heart sounds. No murmur heard. No friction rub. No gallop. Pulmonary:      Effort: Pulmonary effort is normal.      Breath sounds: Normal breath sounds. Abdominal:      General: Abdomen is flat. Bowel sounds are normal.      Palpations: Abdomen is soft. Musculoskeletal:      Cervical back: Normal range of motion and neck supple. Skin:     General: Skin is warm and dry. Neurological:      Mental Status: He is alert. Pertinent Labs/Studies:  Lab Results   Component Value Date/Time    Prostate Specific Ag 1.5 06/23/2021 02:45 PM    Prostate Specific Ag 1.8 06/17/2020 02:32 PM    Prostate Specific Ag 1.3 05/18/2018 12:25 PM    Prostate Specific Ag 1.1 09/22/2010 09:09 AM    Prostate Specific Ag 0.9 02/25/2009 10:41 AM     Lab Results   Component Value Date/Time    Cholesterol, total 144 06/23/2021 02:45 PM    HDL Cholesterol 41 06/23/2021 02:45 PM    LDL, calculated 69.4 06/23/2021 02:45 PM    VLDL, calculated 33.6 06/23/2021 02:45 PM    Triglyceride 168 (H) 06/23/2021 02:45 PM    CHOL/HDL Ratio 3.5 06/23/2021 02:45 PM     Lab Results   Component Value Date/Time    Sodium 140 06/23/2021 02:45 PM    Potassium 4.4 06/23/2021 02:45 PM    Chloride 105 06/23/2021 02:45 PM    CO2 28 06/23/2021 02:45 PM    Anion gap 7 06/23/2021 02:45 PM    Glucose 84 06/23/2021 02:45 PM    BUN 11 06/23/2021 02:45 PM    Creatinine 0.96 06/23/2021 02:45 PM    BUN/Creatinine ratio 11 (L) 06/23/2021 02:45 PM    GFR est AA >60 06/23/2021 02:45 PM    GFR est non-AA >60 06/23/2021 02:45 PM    Calcium 10.0 06/23/2021 02:45 PM    Bilirubin, total 0.8 06/23/2021 02:45 PM    Alk.  phosphatase 48 06/23/2021 02:45 PM    Protein, total 7.8 06/23/2021 02:45 PM    Albumin 4.1 06/23/2021 02:45 PM    Globulin 3.7 06/23/2021 02:45 PM    A-G Ratio 1.1 06/23/2021 02:45 PM    ALT (SGPT) 37 06/23/2021 02:45 PM       Assessment and orders:       ICD-10-CM ICD-9-CM    1. Visit for [facility] health check  Z00.00 V70.0    2. Impaired fasting glucose  R73.01 790.21 metFORMIN ER (GLUCOPHAGE XR) 500 mg tablet   3. History of herniated intervertebral disc  Z87.39 V13.59 cyclobenzaprine (FLEXERIL) 10 mg tablet   4. Gastroesophageal reflux disease without esophagitis  K21.9 530.81 omeprazole (PRILOSEC) 40 mg capsule   5. Erectile dysfunction, unspecified erectile dysfunction type  N52.9 607.84 tadalafiL (CIALIS) 5 mg tablet   6. Primary hypertension  I10 401.9 CBC W/O DIFF      METABOLIC PANEL, COMPREHENSIVE   7. Mixed hyperlipidemia  E78.2 272.2 LIPID PANEL     Diagnoses and all orders for this visit:    1. Visit for [facility] health check: Overall well. Paperwork completed. 2. Impaired fasting glucose: Refill  -     metFORMIN ER (GLUCOPHAGE XR) 500 mg tablet; TAKE 1 TABLET DAILY WITH DINNER    3. History of herniated intervertebral disc  -     cyclobenzaprine (FLEXERIL) 10 mg tablet; Take 1 Tablet by mouth three (3) times daily as needed for Muscle Spasm(s). 4. Gastroesophageal reflux disease without esophagitis  -     omeprazole (PRILOSEC) 40 mg capsule; Take 1 Capsule by mouth daily. 5. Erectile dysfunction, unspecified erectile dysfunction type  -     tadalafiL (CIALIS) 5 mg tablet; Take 1 Tablet by mouth daily. 6. Primary hypertension: Our goal is to normalize the blood pressure to decrease the risks of strokes and heart attacks. The patient is in agreement with the plan. -     CBC W/O DIFF; Future  -     METABOLIC PANEL, COMPREHENSIVE; Future    7. Mixed hyperlipidemia: The patient is aware of our goal to reduce or eliminate the long term problems (such as strokes and heart attacks) related to poorly controlled Triglycerides, LDL, Cholesterol.    -     LIPID PANEL; Future      Follow-up and Dispositions    · Return in about 6 months (around 7/27/2022).            I have discussed the diagnosis with the patient and the intended plan as seen in the above orders. Social history, medical history, and labs were reviewed. The patient has received an after-visit summary and questions were answered concerning future plans. I have discussed medication side effects and warnings with the patient as well.     Aileen Baumgarten, MD [facility]  01/27/22

## 2022-03-19 PROBLEM — E83.52 SERUM CALCIUM ELEVATED: Status: ACTIVE | Noted: 2019-06-10

## 2022-03-19 PROBLEM — R73.03 PREDIABETES: Status: ACTIVE | Noted: 2019-12-06

## 2022-03-19 PROBLEM — E66.01 OBESITY, MORBID (HCC): Status: ACTIVE | Noted: 2018-05-25

## 2022-06-07 ENCOUNTER — OFFICE VISIT (OUTPATIENT)
Dept: FAMILY MEDICINE CLINIC | Age: 52
End: 2022-06-07
Payer: COMMERCIAL

## 2022-06-07 VITALS
SYSTOLIC BLOOD PRESSURE: 127 MMHG | RESPIRATION RATE: 18 BRPM | TEMPERATURE: 98.3 F | DIASTOLIC BLOOD PRESSURE: 85 MMHG | BODY MASS INDEX: 40.03 KG/M2 | HEART RATE: 85 BPM | OXYGEN SATURATION: 96 % | HEIGHT: 70 IN | WEIGHT: 279.6 LBS

## 2022-06-07 DIAGNOSIS — I10 PRIMARY HYPERTENSION: Primary | Chronic | ICD-10-CM

## 2022-06-07 DIAGNOSIS — I10 ESSENTIAL HYPERTENSION: ICD-10-CM

## 2022-06-07 DIAGNOSIS — R73.03 PREDIABETES: Chronic | ICD-10-CM

## 2022-06-07 DIAGNOSIS — E66.01 OBESITY, CLASS III, BMI 40-49.9 (MORBID OBESITY) (HCC): ICD-10-CM

## 2022-06-07 DIAGNOSIS — K21.9 GASTROESOPHAGEAL REFLUX DISEASE WITHOUT ESOPHAGITIS: Chronic | ICD-10-CM

## 2022-06-07 DIAGNOSIS — R73.01 IMPAIRED FASTING GLUCOSE: ICD-10-CM

## 2022-06-07 DIAGNOSIS — N52.9 ERECTILE DYSFUNCTION, UNSPECIFIED ERECTILE DYSFUNCTION TYPE: ICD-10-CM

## 2022-06-07 DIAGNOSIS — Z88.9 H/O SEASONAL ALLERGIES: ICD-10-CM

## 2022-06-07 DIAGNOSIS — E78.2 MIXED HYPERLIPIDEMIA: Chronic | ICD-10-CM

## 2022-06-07 DIAGNOSIS — Z87.39 HISTORY OF HERNIATED INTERVERTEBRAL DISC: ICD-10-CM

## 2022-06-07 PROCEDURE — 99214 OFFICE O/P EST MOD 30 MIN: CPT | Performed by: FAMILY MEDICINE

## 2022-06-07 RX ORDER — OMEPRAZOLE 40 MG/1
40 CAPSULE, DELAYED RELEASE ORAL DAILY
Qty: 90 CAPSULE | Refills: 3 | Status: SHIPPED | OUTPATIENT
Start: 2022-06-07

## 2022-06-07 RX ORDER — LISINOPRIL 20 MG/1
20 TABLET ORAL DAILY
Qty: 90 TABLET | Refills: 3 | Status: SHIPPED | OUTPATIENT
Start: 2022-06-07

## 2022-06-07 RX ORDER — EZETIMIBE AND SIMVASTATIN 10; 20 MG/1; MG/1
1 TABLET ORAL DAILY
Qty: 90 TABLET | Refills: 3 | Status: SHIPPED | OUTPATIENT
Start: 2022-06-07

## 2022-06-07 RX ORDER — CYCLOBENZAPRINE HCL 10 MG
10 TABLET ORAL
Qty: 90 TABLET | Refills: 3 | Status: SHIPPED | OUTPATIENT
Start: 2022-06-07

## 2022-06-07 RX ORDER — TADALAFIL 5 MG/1
5 TABLET ORAL DAILY
Qty: 90 TABLET | Refills: 3 | Status: SHIPPED | OUTPATIENT
Start: 2022-06-07

## 2022-06-07 RX ORDER — DESLORATADINE 5 MG/1
5 TABLET ORAL DAILY
Qty: 90 TABLET | Refills: 3 | Status: SHIPPED | OUTPATIENT
Start: 2022-06-07

## 2022-06-07 RX ORDER — IBUPROFEN 800 MG/1
800 TABLET ORAL
Qty: 180 TABLET | Refills: 3 | Status: SHIPPED | OUTPATIENT
Start: 2022-06-07

## 2022-06-07 RX ORDER — METFORMIN HYDROCHLORIDE 500 MG/1
TABLET, EXTENDED RELEASE ORAL
Qty: 90 TABLET | Refills: 3 | Status: SHIPPED | OUTPATIENT
Start: 2022-06-07

## 2022-06-07 NOTE — PROGRESS NOTES
1. Have you been to the ER, urgent care clinic since your last visit? Hospitalized since your last visit? No    2. Have you seen or consulted any other health care providers outside of the [address] [place] [address] since your last visit? Include any pap smears or colon screening. No    3. For patients aged 39-70: Has the patient had a colonoscopy / FIT/ Cologuard? Yes 2021 or 2020 per patient     If the patient is female:    4. For patients aged 41-77: Has the patient had a mammogram within the past 2 years? NA - based on age or sex      11. For patients aged 21-65: Has the patient had a pap smear? NA - based on age or sex     Reviewed record in preparation for visit and have necessary documentation  Pt did not bring medication to office visit for review  Patient is accompanied by self I have received verbal consent from Fatuma Hand to discuss any/all medical information while they are present in the room.     Goals that were addressed and/or need to be completed during or after this appointment include     Health Maintenance Due   Topic Date Due    Shingrix Vaccine Age 49> (1 of 2) Never done    COVID-19 Vaccine (3 - Booster for [employer] series) 09/21/2021

## 2022-06-07 NOTE — PROGRESS NOTES
[facility]    History of Present Illness:   Trinidad Dillard is a 47 y.o. male with history of HTN, GERD, ED, Obesity, Allergies  CC: Follow up  History provided by patient and Records    HPI:  Hypertension Follow up:  Currently Taking:   Key CAD CHF Meds             lisinopriL (PRINIVIL, ZESTRIL) 20 mg tablet (Taking) Take 1 Tablet by mouth daily. The patient reports:  taking medications as instructed, no medication side effects noted, no TIA's, no chest pain on exertion, no dyspnea on exertion, no swelling of ankles, no orthostatic dizziness or lightheadedness, no orthopnea or paroxysmal nocturnal dyspnea. BP Readings from Last 3 Encounters:   06/07/22 127/85   01/27/22 118/75   10/07/21 131/84      Hypertriglyceridemia Follow up:   Cardiovascular risks for him are: hypertension  hyperlipidemia. Current Medications:  Key Antihyperlipidemia Meds     The patient is on no antihyperlipidemia meds. Compliance: YES   Myalgias: NO   Fatigue: NO   Other side effects: NO     Wt Readings from Last 3 Encounters:   06/07/22 279 lb 9.6 oz (126.8 kg)   01/27/22 271 lb (122.9 kg)   10/07/21 270 lb 9.6 oz (122.7 kg)       Lab Results   Component Value Date/Time    Cholesterol, total 144 06/23/2021 02:45 PM    HDL Cholesterol 41 06/23/2021 02:45 PM    LDL, calculated 69.4 06/23/2021 02:45 PM    VLDL, calculated 33.6 06/23/2021 02:45 PM    Triglyceride 168 (H) 06/23/2021 02:45 PM    CHOL/HDL Ratio 3.5 06/23/2021 02:45 PM      Lab Results   Component Value Date/Time    ALT (SGPT) 37 06/23/2021 02:45 PM    AST (SGOT) 24 06/23/2021 02:45 PM    Alk.  phosphatase 48 06/23/2021 02:45 PM    Bilirubin, total 0.8 06/23/2021 02:45 PM      Prediabetes:  Patient reports good energy levels and doing well overall.  - Polydipsia?: No  - Polyphagia?: No  - Polyuria?: No  - Exercise: None  - Diet: Monitors  - Current related medications: Metformin  - Weight loss since last visit?: No     Wt Readings from Last 3 Encounters:   06/07/22 279 lb 9.6 oz (126.8 kg)   01/27/22 271 lb (122.9 kg)   10/07/21 270 lb 9.6 oz (122.7 kg)        Health Maintenance  Health Maintenance Due   Topic Date Due    Shingrix Vaccine Age 49> (1 of 2) Never done    COVID-19 Vaccine (3 - Booster for [employer] series) 09/21/2021       Past Medical, Family, and Social History:     Current Outpatient Medications on File Prior to Visit   Medication Sig Dispense Refill    chlorhexidine (PERIDEX) 0.12 % solution USE 1 CAP. FILL TO LINE (15 ML) RINSE MOUTH FOR 30 SECONDS AND SPIT, USE EVERY 12 HOURS AFTER BRUSHING AND FLOSSING 473 mL 21    [DISCONTINUED] metFORMIN ER (GLUCOPHAGE XR) 500 mg tablet TAKE 1 TABLET DAILY WITH DINNER 90 Tablet 3    [DISCONTINUED] cyclobenzaprine (FLEXERIL) 10 mg tablet Take 1 Tablet by mouth three (3) times daily as needed for Muscle Spasm(s). 90 Tablet 3    [DISCONTINUED] omeprazole (PRILOSEC) 40 mg capsule Take 1 Capsule by mouth daily. 90 Capsule 3    [DISCONTINUED] tadalafiL (CIALIS) 5 mg tablet Take 1 Tablet by mouth daily. 90 Tablet 3    [DISCONTINUED] lisinopriL (PRINIVIL, ZESTRIL) 20 mg tablet Take 1 Tablet by mouth daily. 90 Tablet 3    [DISCONTINUED] ezetimibe-simvastatin (Vytorin 10/20) 10-20 mg per tablet Take 1 Tablet by mouth daily. 90 Tablet 3    [DISCONTINUED] desloratadine (CLARINEX) 5 mg tablet Take 1 Tablet by mouth daily. 90 Tablet 3    [DISCONTINUED] ibuprofen (MOTRIN) 800 mg tablet Take 1 Tablet by mouth every eight (8) hours as needed for Pain. 180 Tablet 3     No current facility-administered medications on file prior to visit.        Patient Active Problem List   Diagnosis Code    History of herniated intervertebral disc Z87.39    Environmental allergies Z91.09    HTN (hypertension) I10    Hyperlipidemia E78.5    ED (erectile dysfunction) of organic origin N52.9    GERD (gastroesophageal reflux disease) K21.9    Obesity, morbid ([facility] [address] E66.01    Serum calcium elevated E83.52    Prediabetes R73.03       Social History     Socioeconomic History    Marital status:    Tobacco Use    Smoking status: Never Smoker    Smokeless tobacco: Never Used   Substance and Sexual Activity    Alcohol use: Yes     Comment: social    Drug use: No   Other Topics Concern    Weight Concern Yes        Review of Systems   Review of Systems   Constitutional: Negative for chills and fever. Cardiovascular: Negative for chest pain and palpitations. Gastrointestinal: Negative for abdominal pain, nausea and vomiting. Neurological: Negative for dizziness, tingling and headaches. Objective:     Visit Vitals  /85 (BP 1 Location: Right upper arm, BP Patient Position: Sitting, BP Cuff Size: Large adult)   Pulse 85   Temp 98.3 °F (36.8 °C) (Oral)   Resp 18   Ht 5' 10\" (1.778 m)   Wt 279 lb 9.6 oz (126.8 kg)   SpO2 96%   BMI 40.12 kg/m²        Physical Exam  Vitals and nursing note reviewed. Constitutional:       Appearance: Normal appearance. HENT:      Head: Normocephalic and atraumatic. Cardiovascular:      Rate and Rhythm: Normal rate and regular rhythm. Pulses: Normal pulses. Heart sounds: Normal heart sounds. No murmur heard. No friction rub. No gallop. Musculoskeletal:      Cervical back: Normal range of motion and neck supple. Neurological:      Mental Status: He is alert. Pertinent Labs/Studies:      Assessment and orders:       ICD-10-CM ICD-9-CM    1. Primary hypertension  I10 401.9 CBC W/O DIFF      METABOLIC PANEL, COMPREHENSIVE   2. Obesity, Class III, BMI 40-49.9 (morbid obesity) ([facility])  E66.01 278.01    3. Gastroesophageal reflux disease without esophagitis  K21.9 530.81 omeprazole (PRILOSEC) 40 mg capsule   4. Mixed hyperlipidemia  E78.2 272.2 LIPID PANEL      ezetimibe-simvastatin (Vytorin 10/20) 10-20 mg per tablet   5. Prediabetes  R73.03 790.29 HEMOGLOBIN A1C WITH EAG   6.  Impaired fasting glucose  R73.01 790.21 metFORMIN ER (GLUCOPHAGE XR) 500 mg tablet   7. History of herniated intervertebral disc  Z87.39 V13.59 cyclobenzaprine (FLEXERIL) 10 mg tablet      ibuprofen (MOTRIN) 800 mg tablet   8. Erectile dysfunction, unspecified erectile dysfunction type  N52.9 607.84 tadalafiL (CIALIS) 5 mg tablet   9. Essential hypertension  I10 401.9 lisinopriL (PRINIVIL, ZESTRIL) 20 mg tablet   10. H/O seasonal allergies  Z88.9 V15.09 desloratadine (CLARINEX) 5 mg tablet     Diagnoses and all orders for this visit:    1. Primary hypertension  -     CBC W/O DIFF; Future  -     METABOLIC PANEL, COMPREHENSIVE; Future    2. Obesity, Class III, BMI 40-49.9 (morbid obesity) ([facility] [address]    3. Gastroesophageal reflux disease without esophagitis  -     omeprazole (PRILOSEC) 40 mg capsule; Take 1 Capsule by mouth daily. 4. Mixed hyperlipidemia  -     LIPID PANEL; Future  -     ezetimibe-simvastatin (Vytorin 10/20) 10-20 mg per tablet; Take 1 Tablet by mouth daily. 5. Prediabetes  -     HEMOGLOBIN A1C WITH EAG; Future    6. Impaired fasting glucose  -     metFORMIN ER (GLUCOPHAGE XR) 500 mg tablet; TAKE 1 TABLET DAILY WITH DINNER    7. History of herniated intervertebral disc  -     cyclobenzaprine (FLEXERIL) 10 mg tablet; Take 1 Tablet by mouth three (3) times daily as needed for Muscle Spasm(s). -     ibuprofen (MOTRIN) 800 mg tablet; Take 1 Tablet by mouth every eight (8) hours as needed for Pain. 8. Erectile dysfunction, unspecified erectile dysfunction type  -     tadalafiL (CIALIS) 5 mg tablet; Take 1 Tablet by mouth daily. 9. Essential hypertension  -     lisinopriL (PRINIVIL, ZESTRIL) 20 mg tablet; Take 1 Tablet by mouth daily. 10. H/O seasonal allergies  -     desloratadine (CLARINEX) 5 mg tablet; Take 1 Tablet by mouth daily. Follow-up and Dispositions    · Return in about 3 months (around 9/7/2022). I have discussed the diagnosis with the patient and the intended plan as seen in the above orders.   Social history, medical history, and labs were reviewed. The patient has received an after-visit summary and questions were answered concerning future plans. I have discussed medication side effects and warnings with the patient as well.     Audry Primrose, MD [facility]  06/07/22

## 2022-06-08 LAB
ALBUMIN SERPL-MCNC: 4 G/DL (ref 3.5–5)
ALBUMIN/GLOB SERPL: 1.1 {RATIO} (ref 1.1–2.2)
ALP SERPL-CCNC: 42 U/L (ref 45–117)
ALT SERPL-CCNC: 42 U/L (ref 12–78)
ANION GAP SERPL CALC-SCNC: 6 MMOL/L (ref 5–15)
AST SERPL-CCNC: 25 U/L (ref 15–37)
BILIRUB SERPL-MCNC: 0.8 MG/DL (ref 0.2–1)
BUN SERPL-MCNC: 11 MG/DL (ref 6–20)
BUN/CREAT SERPL: 10 (ref 12–20)
CALCIUM SERPL-MCNC: 10.3 MG/DL (ref 8.5–10.1)
CHLORIDE SERPL-SCNC: 108 MMOL/L (ref 97–108)
CHOLEST SERPL-MCNC: 123 MG/DL
CO2 SERPL-SCNC: 27 MMOL/L (ref 21–32)
CREAT SERPL-MCNC: 1.07 MG/DL (ref 0.7–1.3)
ERYTHROCYTE [DISTWIDTH] IN BLOOD BY AUTOMATED COUNT: 13.8 % (ref 11.5–14.5)
EST. AVERAGE GLUCOSE BLD GHB EST-MCNC: 120 MG/DL
GLOBULIN SER CALC-MCNC: 3.5 G/DL (ref 2–4)
GLUCOSE SERPL-MCNC: 103 MG/DL (ref 65–100)
HBA1C MFR BLD: 5.8 % (ref 4–5.6)
HCT VFR BLD AUTO: 50.9 % (ref 36.6–50.3)
HDLC SERPL-MCNC: 38 MG/DL
HDLC SERPL: 3.2 {RATIO} (ref 0–5)
HGB BLD-MCNC: 15.6 G/DL (ref 12.1–17)
LDLC SERPL CALC-MCNC: 58.2 MG/DL (ref 0–100)
MCH RBC QN AUTO: 29.7 PG (ref 26–34)
MCHC RBC AUTO-ENTMCNC: 30.6 G/DL (ref 30–36.5)
MCV RBC AUTO: 96.8 FL (ref 80–99)
NRBC # BLD: 0 K/UL (ref 0–0.01)
NRBC BLD-RTO: 0 PER 100 WBC
PLATELET # BLD AUTO: 249 K/UL (ref 150–400)
PMV BLD AUTO: 10.6 FL (ref 8.9–12.9)
POTASSIUM SERPL-SCNC: 4.6 MMOL/L (ref 3.5–5.1)
PROT SERPL-MCNC: 7.5 G/DL (ref 6.4–8.2)
RBC # BLD AUTO: 5.26 M/UL (ref 4.1–5.7)
SODIUM SERPL-SCNC: 141 MMOL/L (ref 136–145)
TRIGL SERPL-MCNC: 134 MG/DL (ref ?–150)
VLDLC SERPL CALC-MCNC: 26.8 MG/DL
WBC # BLD AUTO: 4.2 K/UL (ref 4.1–11.1)

## 2022-09-07 ENCOUNTER — OFFICE VISIT (OUTPATIENT)
Dept: FAMILY MEDICINE CLINIC | Age: 52
End: 2022-09-07
Payer: COMMERCIAL

## 2022-09-07 VITALS
HEART RATE: 71 BPM | BODY MASS INDEX: 38.08 KG/M2 | RESPIRATION RATE: 20 BRPM | OXYGEN SATURATION: 96 % | WEIGHT: 266 LBS | TEMPERATURE: 97.8 F | SYSTOLIC BLOOD PRESSURE: 134 MMHG | HEIGHT: 70 IN | DIASTOLIC BLOOD PRESSURE: 89 MMHG

## 2022-09-07 DIAGNOSIS — I10 PRIMARY HYPERTENSION: Primary | Chronic | ICD-10-CM

## 2022-09-07 DIAGNOSIS — R73.03 PREDIABETES: Chronic | ICD-10-CM

## 2022-09-07 DIAGNOSIS — E78.2 MIXED HYPERLIPIDEMIA: Chronic | ICD-10-CM

## 2022-09-07 DIAGNOSIS — E66.01 OBESITY, MORBID (HCC): ICD-10-CM

## 2022-09-07 DIAGNOSIS — K21.9 GASTROESOPHAGEAL REFLUX DISEASE WITHOUT ESOPHAGITIS: Chronic | ICD-10-CM

## 2022-09-07 DIAGNOSIS — N52.9 ED (ERECTILE DYSFUNCTION) OF ORGANIC ORIGIN: Chronic | ICD-10-CM

## 2022-09-07 PROCEDURE — 99214 OFFICE O/P EST MOD 30 MIN: CPT | Performed by: FAMILY MEDICINE

## 2022-09-07 NOTE — PROGRESS NOTES
1. Have you been to the ER, urgent care clinic since your last visit? Hospitalized since your last visit? No    2. Have you seen or consulted any other health care providers outside of the [address] [place] [address] since your last visit? Include any pap smears or colon screening. No  Reviewed record in preparation for visit and have necessary documentation  Pt did not bring medication to office visit for review  opportunity was given for questions      3. For patients aged 39-70: Has the patient had a colonoscopy / FIT/ Cologuard? Yes - no Care Gap present      If the patient is female:    4. For patients aged 41-77: Has the patient had a mammogram within the past 2 years? NA - based on age or sex      11. For patients aged 21-65: Has the patient had a pap smear?  NA - based on age or sex      Goals that were addressed and/or need to be completed during or after this appointment include   Health Maintenance Due   Topic Date Due    Shingrix Vaccine Age 49> (1 of 2) Never done    COVID-19 Vaccine (3 - Booster for [employer] series) 09/21/2021

## 2022-09-07 NOTE — PROGRESS NOTES
[facility]    History of Present Illness:   Antonio Hines is a 47 y.o. male with history of  HTN, GERD, ED, Obesity, Allergies  CC: Follow up   History provided by patient and Records    HPI:  Hypertension Follow up:  Currently Taking:   Key CAD CHF Meds               lisinopriL (PRINIVIL, ZESTRIL) 20 mg tablet (Taking) Take 1 Tablet by mouth daily. The patient reports:  taking medications as instructed, no medication side effects noted, no TIA's, no chest pain on exertion, no dyspnea on exertion, no swelling of ankles, no orthostatic dizziness or lightheadedness, no orthopnea or paroxysmal nocturnal dyspnea. BP Readings from Last 3 Encounters:   09/07/22 134/89   06/07/22 127/85   01/27/22 118/75      Prediabetes:  Patient reports good energy levels and doing well overall.  - Polydipsia?: No  - Polyphagia?: No  - Polyuria?: No  - Exercise: None  - Diet: Avoids carbs  - Current related medications: Metformin  - Weight loss since last visit?: Yes    Wt Readings from Last 3 Encounters:   09/07/22 266 lb (120.7 kg)   06/07/22 279 lb 9.6 oz (126.8 kg)   01/27/22 271 lb (122.9 kg)      GERD: Overall stable    Health Maintenance  Health Maintenance Due   Topic Date Due    Shingrix Vaccine Age 49> (1 of 2) Never done    COVID-19 Vaccine (3 - Booster for [employer] series) 09/21/2021       Past Medical, Family, and Social History:     Current Outpatient Medications on File Prior to Visit   Medication Sig Dispense Refill    chlorhexidine (PERIDEX) 0.12 % solution USE 1 CAP. FILL TO LINE (15 ML) RINSE MOUTH FOR 30 SECONDS AND SPIT, USE EVERY 12 HOURS AFTER BRUSHING AND FLOSSING 473 mL 2    metFORMIN ER (GLUCOPHAGE XR) 500 mg tablet TAKE 1 TABLET DAILY WITH DINNER 90 Tablet 3    cyclobenzaprine (FLEXERIL) 10 mg tablet Take 1 Tablet by mouth three (3) times daily as needed for Muscle Spasm(s). 90 Tablet 3    omeprazole (PRILOSEC) 40 mg capsule Take 1 Capsule by mouth daily.  90 Capsule 3 tadalafiL (CIALIS) 5 mg tablet Take 1 Tablet by mouth daily. 90 Tablet 3    lisinopriL (PRINIVIL, ZESTRIL) 20 mg tablet Take 1 Tablet by mouth daily. 90 Tablet 3    ezetimibe-simvastatin (Vytorin 10/20) 10-20 mg per tablet Take 1 Tablet by mouth daily. 90 Tablet 3    desloratadine (CLARINEX) 5 mg tablet Take 1 Tablet by mouth daily. 90 Tablet 3    ibuprofen (MOTRIN) 800 mg tablet Take 1 Tablet by mouth every eight (8) hours as needed for Pain. 180 Tablet 3     No current facility-administered medications on file prior to visit. Patient Active Problem List   Diagnosis Code    History of herniated intervertebral disc Z87.39    Environmental allergies Z91.09    HTN (hypertension) I10    Hyperlipidemia E78.5    ED (erectile dysfunction) of organic origin N52.9    GERD (gastroesophageal reflux disease) K21.9    Obesity, morbid ([facility] [address] E66.01    Serum calcium elevated E83.52    Prediabetes R73.03       Social History     Socioeconomic History    Marital status:    Tobacco Use    Smoking status: Never    Smokeless tobacco: Never   Substance and Sexual Activity    Alcohol use: Yes     Comment: social    Drug use: No   Other Topics Concern    Weight Concern Yes        Review of Systems   Review of Systems   Constitutional:  Negative for chills and fever. Cardiovascular:  Negative for chest pain, palpitations and orthopnea. Gastrointestinal:  Negative for abdominal pain, nausea and vomiting. Neurological:  Negative for dizziness, tingling and headaches. Objective:   Visit Vitals  /89   Pulse 71   Temp 97.8 °F (36.6 °C)   Resp 20   Ht 5' 10\" (1.778 m)   Wt 266 lb (120.7 kg)   SpO2 96%   BMI 38.17 kg/m²        Physical Exam  Vitals and nursing note reviewed. Constitutional:       Appearance: Normal appearance. HENT:      Head: Normocephalic and atraumatic. Cardiovascular:      Rate and Rhythm: Normal rate and regular rhythm. Pulses: Normal pulses. Heart sounds: Normal heart sounds. Pulmonary:      Effort: Pulmonary effort is normal.      Breath sounds: Normal breath sounds. Abdominal:      General: Abdomen is flat. Bowel sounds are normal.      Palpations: Abdomen is soft. Musculoskeletal:         General: Normal range of motion. Cervical back: Normal range of motion and neck supple. Skin:     General: Skin is warm and dry. Neurological:      Mental Status: He is alert. Pertinent Labs/Studies:      Assessment and orders:       ICD-10-CM ICD-9-CM    1. Primary hypertension  I10 401.9 CBC W/O DIFF      METABOLIC PANEL, COMPREHENSIVE      2. Gastroesophageal reflux disease without esophagitis  K21.9 530.81       3. Mixed hyperlipidemia  E78.2 272.2 LIPID PANEL      4. ED (erectile dysfunction) of organic origin  N52.9 607.84       5. Prediabetes  R73.03 790.29       6. Obesity, morbid ([facility] [address]  E66.01 278.01         Diagnoses and all orders for this visit:    1. Primary hypertension: Our goal is to normalize the blood pressure to decrease the risks of strokes and heart attacks. The patient is in agreement with the plan. -     CBC W/O DIFF; Future  -     METABOLIC PANEL, COMPREHENSIVE; Future    2. Gastroesophageal reflux disease without esophagitis    3. Mixed hyperlipidemia: The patient is aware of our goal to reduce or eliminate the long term problems (such as strokes and heart attacks) related to poorly controlled Triglycerides, LDL, Cholesterol.   -     LIPID PANEL; Future    4. ED (erectile dysfunction) of organic origin    5. Prediabetes: Discussed with patient today that the goal for their diabetes is to have a HgA1C<7 and ideally as close to 6.5 as possible. We discussed diet and medications. The goal for the cholesterol LDL is less than 70 and HDL>40. Patient is aware of the need for yearly eye exams and to take care of their feet daily. Discussed with patient that blood pressure should be less than 130/80 and watching salt intake is very important.       6. Obesity, morbid ([facility] [address]    Follow-up and Dispositions    Return in about 3 months (around 12/7/2022). I have discussed the diagnosis with the patient and the intended plan as seen in the above orders. Social history, medical history, and labs were reviewed. The patient has received an after-visit summary and questions were answered concerning future plans. I have discussed medication side effects and warnings with the patient as well.     MD RENA Tay & ALEXANDRA CHOU [facility]  09/07/22

## 2022-09-08 LAB
ALBUMIN SERPL-MCNC: 4.3 G/DL (ref 3.5–5)
ALBUMIN/GLOB SERPL: 1.2 {RATIO} (ref 1.1–2.2)
ALP SERPL-CCNC: 45 U/L (ref 45–117)
ALT SERPL-CCNC: 37 U/L (ref 12–78)
ANION GAP SERPL CALC-SCNC: 4 MMOL/L (ref 5–15)
AST SERPL-CCNC: 22 U/L (ref 15–37)
BILIRUB SERPL-MCNC: 0.6 MG/DL (ref 0.2–1)
BUN SERPL-MCNC: 14 MG/DL (ref 6–20)
BUN/CREAT SERPL: 13 (ref 12–20)
CALCIUM SERPL-MCNC: 10.5 MG/DL (ref 8.5–10.1)
CHLORIDE SERPL-SCNC: 106 MMOL/L (ref 97–108)
CHOLEST SERPL-MCNC: 110 MG/DL
CO2 SERPL-SCNC: 28 MMOL/L (ref 21–32)
CREAT SERPL-MCNC: 1.12 MG/DL (ref 0.7–1.3)
ERYTHROCYTE [DISTWIDTH] IN BLOOD BY AUTOMATED COUNT: 13.9 % (ref 11.5–14.5)
GLOBULIN SER CALC-MCNC: 3.6 G/DL (ref 2–4)
GLUCOSE SERPL-MCNC: 98 MG/DL (ref 65–100)
HCT VFR BLD AUTO: 49.1 % (ref 36.6–50.3)
HDLC SERPL-MCNC: 38 MG/DL
HDLC SERPL: 2.9 {RATIO} (ref 0–5)
HGB BLD-MCNC: 15.8 G/DL (ref 12.1–17)
LDLC SERPL CALC-MCNC: 42 MG/DL (ref 0–100)
MCH RBC QN AUTO: 30.6 PG (ref 26–34)
MCHC RBC AUTO-ENTMCNC: 32.2 G/DL (ref 30–36.5)
MCV RBC AUTO: 95.2 FL (ref 80–99)
NRBC # BLD: 0 K/UL (ref 0–0.01)
NRBC BLD-RTO: 0 PER 100 WBC
PLATELET # BLD AUTO: 279 K/UL (ref 150–400)
PMV BLD AUTO: 10.7 FL (ref 8.9–12.9)
POTASSIUM SERPL-SCNC: 4.7 MMOL/L (ref 3.5–5.1)
PROT SERPL-MCNC: 7.9 G/DL (ref 6.4–8.2)
RBC # BLD AUTO: 5.16 M/UL (ref 4.1–5.7)
SODIUM SERPL-SCNC: 138 MMOL/L (ref 136–145)
TRIGL SERPL-MCNC: 150 MG/DL (ref ?–150)
VLDLC SERPL CALC-MCNC: 30 MG/DL
WBC # BLD AUTO: 4.6 K/UL (ref 4.1–11.1)

## 2022-10-20 ENCOUNTER — TELEPHONE (OUTPATIENT)
Dept: FAMILY MEDICINE CLINIC | Age: 52
End: 2022-10-20

## 2022-10-26 ENCOUNTER — CLINICAL SUPPORT (OUTPATIENT)
Dept: FAMILY MEDICINE CLINIC | Age: 52
End: 2022-10-26
Payer: COMMERCIAL

## 2022-10-26 VITALS
RESPIRATION RATE: 18 BRPM | OXYGEN SATURATION: 95 % | HEART RATE: 70 BPM | DIASTOLIC BLOOD PRESSURE: 86 MMHG | SYSTOLIC BLOOD PRESSURE: 126 MMHG | TEMPERATURE: 98.7 F

## 2022-10-26 DIAGNOSIS — Z23 ENCOUNTER FOR IMMUNIZATION: Primary | ICD-10-CM

## 2022-10-26 PROCEDURE — 90471 IMMUNIZATION ADMIN: CPT | Performed by: FAMILY MEDICINE

## 2022-10-26 PROCEDURE — 90686 IIV4 VACC NO PRSV 0.5 ML IM: CPT | Performed by: FAMILY MEDICINE

## 2023-01-19 NOTE — PROGRESS NOTES
ADMITTING DIAGNOSIS: Intrauterine pregnancy at     32 1/2         weeks with cramping and decreased fetal movement. Contentious relationship with significant other. DISCHARGE DIAGNOSIS: SAME, as well as No evidence of  labor or maternal or fetal compromise. HOSPITAL COURSE IN DETAIL:  Pain controlled with Tylenol. Cervix remained unchanged. She met with , and declined to leave significant other. Fetal heart rate tracing remained reactive. U/A was negative. She had no further complaints and felt ready to go home. CHEMISTRY COMMON GROUP    Meter Glucose             93  103   Meter Glucose     UA MACROSCOPIC GRP    Color, UA             Yellow     Color, UA     Clarity, UA             Clear     Clarity, UA     Glucose, UA             Negat. .. Glucose, UA     Bilirubin, Urine             Negat. .. Bilirubin, Urine     Ketones, Urine             Negat. .. Ketones, Urine     Specific Gravity, UA             1.020     Specific Gravity, UA     Blood, Urine             Negat. .. Blood, Urine     pH, UA             6.5     pH, UA     Protein, UA             Negat. .. Protein, UA     Urobilinogen, Urine             0.2     Urobilinogen, Urine     Nitrite, Urine             Negat. ..      Nitrite, Urine     Leukocyte Esterase, Urine             LARGE     Leukocyte Esterase, Urine     UA MICROSCOPIC GRP    WBC, UA             2-5     WBC, UA     RBC, UA             NONE     RBC, UA     Bacteria, UA             RARE     Bacteria, UA     Leukocyte Esterase, Urine             LARGE                      POC pCO2 56.4 (H) mmHg      POC PO2 15.7 (LL) mmHg      POC HCO3 27.4 (H) mmol/L      POC Base Excess -0.4 mmol/L      POC O2 SAT 16.8 (L) %      POC CPB No      POC  ID [id number]      POC Device ID [id number]     POCT blood gases [[id number]] (Abnormal) Collected: 17 1304     Updated: 17 1308       Sample Type Cord-Venous      POC pH 7.313 (L)      POC pCO2 Chief Complaint   Patient presents with    Back Pain    Other     ortho referral     1. Have you been to the ER, urgent care clinic since your last visit? Hospitalized since your last visit? No    2. Have you seen or consulted any other health care providers outside of the [address] [place] [address] since your last visit? Include any pap smears or colon screening.  No    Reviewed record in preparation for visit and have necessary documentation  Pt did not bring medication to office visit for review  opportunity was given for questions  Goals that were addressed and/or need to be completed during or after this appointment include   Health Maintenance Due   Topic Date Due    Shingrix Vaccine Age 50> (1 of 2) 03/19/2019 46. 5 (H) mmHg      POC PO2 30.3 (LL) mmHg      POC HCO3 23.6 mmol/L      POC Base Excess -3.0 mmol/L      POC O2 SAT 52.0 (L) %      POC CPB No      POC  ID [id number]      POC Device ID [id number]               She was stable. She was sent home with precautions regarding:        Fetal movement         labor          She is to keep her follow up appointment at the office. She indicated understanding of all instructions.

## 2023-04-26 ENCOUNTER — OFFICE VISIT (OUTPATIENT)
Dept: FAMILY MEDICINE CLINIC | Age: 53
End: 2023-04-26
Payer: COMMERCIAL

## 2023-04-26 VITALS
RESPIRATION RATE: 20 BRPM | HEART RATE: 59 BPM | TEMPERATURE: 98.3 F | OXYGEN SATURATION: 94 % | WEIGHT: 270 LBS | DIASTOLIC BLOOD PRESSURE: 84 MMHG | SYSTOLIC BLOOD PRESSURE: 129 MMHG | HEIGHT: 70 IN | BODY MASS INDEX: 38.65 KG/M2

## 2023-04-26 DIAGNOSIS — K21.9 GASTROESOPHAGEAL REFLUX DISEASE WITHOUT ESOPHAGITIS: Chronic | ICD-10-CM

## 2023-04-26 DIAGNOSIS — E66.01 SEVERE OBESITY (BMI 35.0-39.9) WITH COMORBIDITY (HCC): ICD-10-CM

## 2023-04-26 DIAGNOSIS — N52.9 ERECTILE DYSFUNCTION, UNSPECIFIED ERECTILE DYSFUNCTION TYPE: ICD-10-CM

## 2023-04-26 DIAGNOSIS — Z87.39 HISTORY OF HERNIATED INTERVERTEBRAL DISC: ICD-10-CM

## 2023-04-26 DIAGNOSIS — I10 PRIMARY HYPERTENSION: Primary | Chronic | ICD-10-CM

## 2023-04-26 DIAGNOSIS — Z88.9 H/O SEASONAL ALLERGIES: ICD-10-CM

## 2023-04-26 DIAGNOSIS — R73.01 IMPAIRED FASTING GLUCOSE: ICD-10-CM

## 2023-04-26 DIAGNOSIS — I10 ESSENTIAL HYPERTENSION: ICD-10-CM

## 2023-04-26 DIAGNOSIS — E78.2 MIXED HYPERLIPIDEMIA: Chronic | ICD-10-CM

## 2023-04-26 DIAGNOSIS — R73.03 PREDIABETES: Chronic | ICD-10-CM

## 2023-04-26 PROCEDURE — 99214 OFFICE O/P EST MOD 30 MIN: CPT | Performed by: FAMILY MEDICINE

## 2023-04-26 PROCEDURE — 3080F DIAST BP >= 90 MM HG: CPT | Performed by: FAMILY MEDICINE

## 2023-04-26 PROCEDURE — 3075F SYST BP GE 130 - 139MM HG: CPT | Performed by: FAMILY MEDICINE

## 2023-04-26 RX ORDER — CHLORHEXIDINE GLUCONATE 1.2 MG/ML
RINSE ORAL
Qty: 473 ML | Refills: 2 | Status: SHIPPED | OUTPATIENT
Start: 2023-04-26

## 2023-04-26 RX ORDER — LISINOPRIL 20 MG/1
20 TABLET ORAL DAILY
Qty: 90 TABLET | Refills: 3 | Status: SHIPPED | OUTPATIENT
Start: 2023-04-26

## 2023-04-26 RX ORDER — TADALAFIL 5 MG/1
5 TABLET ORAL DAILY
Qty: 90 TABLET | Refills: 3 | Status: SHIPPED | OUTPATIENT
Start: 2023-04-26

## 2023-04-26 RX ORDER — EZETIMIBE AND SIMVASTATIN 10; 20 MG/1; MG/1
1 TABLET ORAL DAILY
Qty: 90 TABLET | Refills: 3 | Status: SHIPPED | OUTPATIENT
Start: 2023-04-26

## 2023-04-26 RX ORDER — METFORMIN HYDROCHLORIDE 500 MG/1
TABLET, EXTENDED RELEASE ORAL
Qty: 90 TABLET | Refills: 3 | Status: SHIPPED | OUTPATIENT
Start: 2023-04-26

## 2023-04-26 RX ORDER — DESLORATADINE 5 MG/1
5 TABLET ORAL DAILY
Qty: 90 TABLET | Refills: 3 | Status: SHIPPED | OUTPATIENT
Start: 2023-04-26

## 2023-04-26 RX ORDER — IBUPROFEN 800 MG/1
800 TABLET ORAL
Qty: 180 TABLET | Refills: 3 | Status: SHIPPED | OUTPATIENT
Start: 2023-04-26

## 2023-04-26 RX ORDER — OMEPRAZOLE 40 MG/1
40 CAPSULE, DELAYED RELEASE ORAL DAILY
Qty: 90 CAPSULE | Refills: 3 | Status: SHIPPED | OUTPATIENT
Start: 2023-04-26

## 2023-04-26 RX ORDER — CYCLOBENZAPRINE HCL 10 MG
10 TABLET ORAL
Qty: 90 TABLET | Refills: 3 | Status: SHIPPED | OUTPATIENT
Start: 2023-04-26

## 2023-04-26 NOTE — PROGRESS NOTES
1. \"Have you been to the ER, urgent care clinic since your last visit? Hospitalized since your last visit? \" No    2. \"Have you seen or consulted any other health care providers outside of the [address] [place] [address] since your last visit? \" No     3. For patients aged 39-70: Has the patient had a colonoscopy / FIT/ Cologuard? Yes - no Care Gap present      If the patient is female:    4. For patients aged 41-77: Has the patient had a mammogram within the past 2 years? NA - based on age or sex      11. For patients aged 21-65: Has the patient had a pap smear?  NA - based on age or sex    Health Maintenance Due   Topic Date Due    Shingles Vaccine (1 of 2) Never done    COVID-19 Vaccine (3 - Booster for [employer] series) 06/16/2021

## 2023-04-26 NOTE — PROGRESS NOTES
[facility]    History of Present Illness:   Carlie Hodgkins is a 46 y.o. male with history of HTN, GERD, ED, Obesity, Allergies  CC: Follow up  History provided by patient and Records    HPI:  Hypertension Follow up:  Currently Taking:   Key CAD CHF Meds               lisinopriL (PRINIVIL, ZESTRIL) 20 mg tablet (Taking) Take 1 Tablet by mouth daily. The patient reports:  taking medications as instructed, no medication side effects noted, no TIA's, no chest pain on exertion, no dyspnea on exertion, no swelling of ankles, no orthostatic dizziness or lightheadedness, no orthopnea or paroxysmal nocturnal dyspnea. BP Readings from Last 3 Encounters:   04/26/23 (!) 137/91   10/26/22 126/86   09/07/22 134/89      Prediabetes:  Patient reports good energy levels and doing well overall.  - Polydipsia?: No  - Polyphagia?: No  - Polyuria?: No  - Exercise: None  - Diet: Avoids carbs  - Current related medications: Metformin  - Weight loss since last visit?: Yes    Wt Readings from Last 3 Encounters:   04/26/23 270 lb (122.5 kg)   09/07/22 266 lb (120.7 kg)   06/07/22 279 lb 9.6 oz (126.8 kg)      GERD: Overall stable    Hypertriglyceridemia Follow up:   Cardiovascular risks for him are: hypertension  hyperlipidemia. Current Medications:  Key Antihyperlipidemia Meds       The patient is on no antihyperlipidemia meds.             Compliance: YES   Myalgias: NO   Fatigue: NO   Other side effects: NO     Wt Readings from Last 3 Encounters:   04/26/23 270 lb (122.5 kg)   09/07/22 266 lb (120.7 kg)   06/07/22 279 lb 9.6 oz (126.8 kg)       Lab Results   Component Value Date/Time    Cholesterol, total 110 09/07/2022 10:20 AM    HDL Cholesterol 38 09/07/2022 10:20 AM    LDL, calculated 42 09/07/2022 10:20 AM    VLDL, calculated 30 09/07/2022 10:20 AM    Triglyceride 150 (H) 09/07/2022 10:20 AM    CHOL/HDL Ratio 2.9 09/07/2022 10:20 AM      Lab Results   Component Value Date/Time    ALT (SGPT) 37 09/07/2022 10:20 AM    AST (SGOT) 22 09/07/2022 10:20 AM    Alk. phosphatase 45 09/07/2022 10:20 AM    Bilirubin, total 0.6 09/07/2022 10:20 AM           Health Maintenance  Health Maintenance Due   Topic Date Due    Shingles Vaccine (1 of 2) Never done    COVID-19 Vaccine (3 - Booster for [employer] series) 06/16/2021       Past Medical, Family, and Social History:     Current Outpatient Medications on File Prior to Visit   Medication Sig Dispense Refill    [DISCONTINUED] chlorhexidine (PERIDEX) 0.12 % solution USE 1 CAP. FILL TO LINE (15 ML) RINSE MOUTH FOR 30 SECONDS AND SPIT, USE EVERY 12 HOURS AFTER BRUSHING AND FLOSSING 473 mL 2    [DISCONTINUED] metFORMIN ER (GLUCOPHAGE XR) 500 mg tablet TAKE 1 TABLET DAILY WITH DINNER 90 Tablet 3    [DISCONTINUED] cyclobenzaprine (FLEXERIL) 10 mg tablet Take 1 Tablet by mouth three (3) times daily as needed for Muscle Spasm(s). 90 Tablet 3    [DISCONTINUED] omeprazole (PRILOSEC) 40 mg capsule Take 1 Capsule by mouth daily. 90 Capsule 3    [DISCONTINUED] tadalafiL (CIALIS) 5 mg tablet Take 1 Tablet by mouth daily. 90 Tablet 3    [DISCONTINUED] lisinopriL (PRINIVIL, ZESTRIL) 20 mg tablet Take 1 Tablet by mouth daily. 90 Tablet 3    [DISCONTINUED] ezetimibe-simvastatin (Vytorin 10/20) 10-20 mg per tablet Take 1 Tablet by mouth daily. 90 Tablet 3    [DISCONTINUED] desloratadine (CLARINEX) 5 mg tablet Take 1 Tablet by mouth daily. 90 Tablet 3    [DISCONTINUED] ibuprofen (MOTRIN) 800 mg tablet Take 1 Tablet by mouth every eight (8) hours as needed for Pain. 180 Tablet 3     No current facility-administered medications on file prior to visit.        Patient Active Problem List   Diagnosis Code    History of herniated intervertebral disc Z87.39    Environmental allergies Z91.09    HTN (hypertension) I10    Hyperlipidemia E78.5    ED (erectile dysfunction) of organic origin N52.9    GERD (gastroesophageal reflux disease) K21.9    Obesity, morbid ([address] E66.01    Serum calcium elevated E83.52    Prediabetes R73.03       Social History     Socioeconomic History    Marital status:    Tobacco Use    Smoking status: Never    Smokeless tobacco: Never   Substance and Sexual Activity    Alcohol use: Yes     Comment: social    Drug use: No   Other Topics Concern    Weight Concern Yes        Review of Systems   Review of Systems   Constitutional:  Negative for chills and fever. Gastrointestinal:  Negative for abdominal pain, nausea and vomiting. Musculoskeletal:  Positive for joint pain. Neurological:  Negative for dizziness, tingling and headaches. Objective:   Visit Vitals  BP (!) 137/91   Pulse (!) 59   Temp 98.3 °F (36.8 °C)   Resp 20   Ht 5' 10\" (1.778 m)   Wt 270 lb (122.5 kg)   SpO2 94%   BMI 38.74 kg/m²        Physical Exam  Vitals and nursing note reviewed. Constitutional:       Appearance: Normal appearance. HENT:      Head: Normocephalic and atraumatic. Cardiovascular:      Rate and Rhythm: Normal rate and regular rhythm. Pulses: Normal pulses. Heart sounds: Normal heart sounds. Pulmonary:      Effort: Pulmonary effort is normal.      Breath sounds: Normal breath sounds. Abdominal:      General: Abdomen is flat. Bowel sounds are normal.      Palpations: Abdomen is soft. Musculoskeletal:      Cervical back: Normal range of motion and neck supple. Comments: Left Knee pain/Tenderness   Skin:     General: Skin is warm and dry. Neurological:      Mental Status: He is alert. Pertinent Labs/Studies:      Assessment and orders:       ICD-10-CM ICD-9-CM    1. Primary hypertension  I10 401.9 CBC W/O DIFF      METABOLIC PANEL, COMPREHENSIVE      2. Impaired fasting glucose  R73.01 790.21 metFORMIN ER (GLUCOPHAGE XR) 500 mg tablet      3. History of herniated intervertebral disc  Z87.39 V13.59 cyclobenzaprine (FLEXERIL) 10 mg tablet      ibuprofen (MOTRIN) 800 mg tablet      4.  Gastroesophageal reflux disease without esophagitis  K21.9 530.81 omeprazole (PRILOSEC) 40 mg capsule      5. Erectile dysfunction, unspecified erectile dysfunction type  N52.9 607.84 tadalafiL (CIALIS) 5 mg tablet      6. Essential hypertension  I10 401.9 lisinopriL (PRINIVIL, ZESTRIL) 20 mg tablet      7. Mixed hyperlipidemia  E78.2 272.2 ezetimibe-simvastatin (Vytorin 10/20) 10-20 mg per tablet      LIPID PANEL      8. H/O seasonal allergies  Z88.9 V15.09 desloratadine (CLARINEX) 5 mg tablet      9. Severe obesity (BMI 35.0-39. 9) with comorbidity ([facility] [address]  E66.01 278.01       10. Prediabetes  R73.03 790.29 HEMOGLOBIN A1C WITH EAG        Diagnoses and all orders for this visit:    1. Primary hypertension: Our goal is to normalize the blood pressure to decrease the risks of strokes and heart attacks. The patient is in agreement with the plan. 2. Impaired fasting glucose  -     metFORMIN ER (GLUCOPHAGE XR) 500 mg tablet; TAKE 1 TABLET DAILY WITH DINNER    3. History of herniated intervertebral disc  -     cyclobenzaprine (FLEXERIL) 10 mg tablet; Take 1 Tablet by mouth three (3) times daily as needed for Muscle Spasm(s). -     ibuprofen (MOTRIN) 800 mg tablet; Take 1 Tablet by mouth every eight (8) hours as needed for Pain. 4. Gastroesophageal reflux disease without esophagitis: Reorder  -     omeprazole (PRILOSEC) 40 mg capsule; Take 1 Capsule by mouth daily. 5. Erectile dysfunction, unspecified erectile dysfunction type  -     tadalafiL (CIALIS) 5 mg tablet; Take 1 Tablet by mouth daily. 6. Essential hypertension  -     lisinopriL (PRINIVIL, ZESTRIL) 20 mg tablet; Take 1 Tablet by mouth daily. 7. Mixed hyperlipidemia: The patient is aware of our goal to reduce or eliminate the long term problems (such as strokes and heart attacks) related to poorly controlled Triglycerides, LDL, Cholesterol.   -     ezetimibe-simvastatin (Vytorin 10/20) 10-20 mg per tablet; Take 1 Tablet by mouth daily. 8. H/O seasonal allergies  -     desloratadine (CLARINEX) 5 mg tablet;  Take 1 Tablet by mouth daily. 9. Severe obesity (BMI 35.0-39. 9) with comorbidity ([address]    Other orders  -     chlorhexidine (PERIDEX) 0.12 % solution; USE 1 CAP. FILL TO LINE (15 ML) RINSE MOUTH FOR 30 SECONDS AND SPIT, USE EVERY 12 HOURS AFTER BRUSHING AND FLOSSING      Follow-up and Dispositions    Return in about 6 months (around 10/26/2023). I have discussed the diagnosis with the patient and the intended plan as seen in the above orders. Social history, medical history, and labs were reviewed. The patient has received an after-visit summary and questions were answered concerning future plans. I have discussed medication side effects and warnings with the patient as well.     Leane Cockayne, MD PRISCILLA CHAN & ALEXANDRA CHOU [facility]  04/26/23

## 2023-04-27 LAB
ALBUMIN SERPL-MCNC: 4 G/DL (ref 3.5–5)
ALBUMIN/GLOB SERPL: 1.2 (ref 1.1–2.2)
ALP SERPL-CCNC: 39 U/L (ref 45–117)
ALT SERPL-CCNC: 41 U/L (ref 12–78)
ANION GAP SERPL CALC-SCNC: 4 MMOL/L (ref 5–15)
AST SERPL-CCNC: 26 U/L (ref 15–37)
BILIRUB SERPL-MCNC: 0.8 MG/DL (ref 0.2–1)
BUN SERPL-MCNC: 11 MG/DL (ref 6–20)
BUN/CREAT SERPL: 11 (ref 12–20)
CALCIUM SERPL-MCNC: 9.8 MG/DL (ref 8.5–10.1)
CHLORIDE SERPL-SCNC: 108 MMOL/L (ref 97–108)
CHOLEST SERPL-MCNC: 119 MG/DL
CO2 SERPL-SCNC: 27 MMOL/L (ref 21–32)
CREAT SERPL-MCNC: 1.02 MG/DL (ref 0.7–1.3)
ERYTHROCYTE [DISTWIDTH] IN BLOOD BY AUTOMATED COUNT: 14.4 % (ref 11.5–14.5)
EST. AVERAGE GLUCOSE BLD GHB EST-MCNC: 105 MG/DL
GLOBULIN SER CALC-MCNC: 3.3 G/DL (ref 2–4)
GLUCOSE SERPL-MCNC: 91 MG/DL (ref 65–100)
HBA1C MFR BLD: 5.3 % (ref 4–5.6)
HCT VFR BLD AUTO: 46.7 % (ref 36.6–50.3)
HDLC SERPL-MCNC: 40 MG/DL
HDLC SERPL: 3 (ref 0–5)
HGB BLD-MCNC: 14.3 G/DL (ref 12.1–17)
LDLC SERPL CALC-MCNC: 51.8 MG/DL (ref 0–100)
MCH RBC QN AUTO: 30.3 PG (ref 26–34)
MCHC RBC AUTO-ENTMCNC: 30.6 G/DL (ref 30–36.5)
MCV RBC AUTO: 98.9 FL (ref 80–99)
NRBC # BLD: 0 K/UL (ref 0–0.01)
NRBC BLD-RTO: 0 PER 100 WBC
PLATELET # BLD AUTO: 266 K/UL (ref 150–400)
PMV BLD AUTO: 11.1 FL (ref 8.9–12.9)
POTASSIUM SERPL-SCNC: 4.3 MMOL/L (ref 3.5–5.1)
PROT SERPL-MCNC: 7.3 G/DL (ref 6.4–8.2)
RBC # BLD AUTO: 4.72 M/UL (ref 4.1–5.7)
SODIUM SERPL-SCNC: 139 MMOL/L (ref 136–145)
TRIGL SERPL-MCNC: 136 MG/DL (ref ?–150)
VLDLC SERPL CALC-MCNC: 27.2 MG/DL
WBC # BLD AUTO: 5.1 K/UL (ref 4.1–11.1)

## 2023-05-15 DIAGNOSIS — M54.50 CHRONIC BILATERAL LOW BACK PAIN WITHOUT SCIATICA: ICD-10-CM

## 2023-05-15 DIAGNOSIS — G89.29 CHRONIC BILATERAL LOW BACK PAIN WITHOUT SCIATICA: ICD-10-CM

## 2023-05-15 DIAGNOSIS — M62.838 MUSCLE SPASM: ICD-10-CM

## 2023-11-03 ENCOUNTER — OFFICE VISIT (OUTPATIENT)
Facility: CLINIC | Age: 53
End: 2023-11-03
Payer: COMMERCIAL

## 2023-11-03 VITALS
SYSTOLIC BLOOD PRESSURE: 146 MMHG | TEMPERATURE: 97.6 F | BODY MASS INDEX: 39.63 KG/M2 | DIASTOLIC BLOOD PRESSURE: 87 MMHG | RESPIRATION RATE: 16 BRPM | HEIGHT: 70 IN | HEART RATE: 70 BPM | OXYGEN SATURATION: 98 % | WEIGHT: 276.8 LBS

## 2023-11-03 DIAGNOSIS — E66.01 OBESITY, MORBID (HCC): ICD-10-CM

## 2023-11-03 DIAGNOSIS — M62.838 MUSCLE SPASM: ICD-10-CM

## 2023-11-03 DIAGNOSIS — E78.2 MIXED HYPERLIPIDEMIA: ICD-10-CM

## 2023-11-03 DIAGNOSIS — I10 PRIMARY HYPERTENSION: Primary | ICD-10-CM

## 2023-11-03 DIAGNOSIS — N52.9 ED (ERECTILE DYSFUNCTION) OF ORGANIC ORIGIN: ICD-10-CM

## 2023-11-03 DIAGNOSIS — R73.03 PREDIABETES: ICD-10-CM

## 2023-11-03 DIAGNOSIS — Z91.09 ENVIRONMENTAL ALLERGIES: ICD-10-CM

## 2023-11-03 DIAGNOSIS — M54.50 CHRONIC BILATERAL LOW BACK PAIN WITHOUT SCIATICA: ICD-10-CM

## 2023-11-03 DIAGNOSIS — Z23 NEED FOR VACCINATION: ICD-10-CM

## 2023-11-03 DIAGNOSIS — K21.9 GASTROESOPHAGEAL REFLUX DISEASE WITHOUT ESOPHAGITIS: ICD-10-CM

## 2023-11-03 DIAGNOSIS — K13.6 IRRITATION OF ORAL CAVITY: ICD-10-CM

## 2023-11-03 DIAGNOSIS — G89.29 CHRONIC BILATERAL LOW BACK PAIN WITHOUT SCIATICA: ICD-10-CM

## 2023-11-03 PROCEDURE — 90471 IMMUNIZATION ADMIN: CPT | Performed by: FAMILY MEDICINE

## 2023-11-03 PROCEDURE — 3080F DIAST BP >= 90 MM HG: CPT | Performed by: FAMILY MEDICINE

## 2023-11-03 PROCEDURE — 99214 OFFICE O/P EST MOD 30 MIN: CPT | Performed by: FAMILY MEDICINE

## 2023-11-03 PROCEDURE — 90674 CCIIV4 VAC NO PRSV 0.5 ML IM: CPT | Performed by: FAMILY MEDICINE

## 2023-11-03 PROCEDURE — 3075F SYST BP GE 130 - 139MM HG: CPT | Performed by: FAMILY MEDICINE

## 2023-11-03 RX ORDER — TIZANIDINE 4 MG/1
4 TABLET ORAL EVERY 8 HOURS PRN
COMMUNITY
Start: 2020-11-13 | End: 2023-11-03 | Stop reason: SDUPTHER

## 2023-11-03 RX ORDER — LISINOPRIL 20 MG/1
20 TABLET ORAL DAILY
Qty: 90 TABLET | Refills: 1 | Status: SHIPPED | OUTPATIENT
Start: 2023-11-03

## 2023-11-03 RX ORDER — DICLOFENAC SODIUM 75 MG/1
75 TABLET, DELAYED RELEASE ORAL 2 TIMES DAILY
Qty: 180 TABLET | Refills: 1 | Status: SHIPPED | OUTPATIENT
Start: 2023-11-03

## 2023-11-03 RX ORDER — OMEPRAZOLE 40 MG/1
40 CAPSULE, DELAYED RELEASE ORAL DAILY
Qty: 90 CAPSULE | Refills: 1 | Status: SHIPPED | OUTPATIENT
Start: 2023-11-03

## 2023-11-03 RX ORDER — CHLORHEXIDINE GLUCONATE ORAL RINSE 1.2 MG/ML
SOLUTION DENTAL
Qty: 1893 ML | Refills: 1 | Status: SHIPPED | OUTPATIENT
Start: 2023-11-03

## 2023-11-03 RX ORDER — EZETIMIBE AND SIMVASTATIN 10; 20 MG/1; MG/1
1 TABLET ORAL DAILY
Qty: 90 TABLET | Refills: 1 | Status: SHIPPED | OUTPATIENT
Start: 2023-11-03

## 2023-11-03 RX ORDER — DICLOFENAC SODIUM 75 MG/1
75 TABLET, DELAYED RELEASE ORAL 2 TIMES DAILY
COMMUNITY
Start: 2023-01-03 | End: 2023-11-03 | Stop reason: SDUPTHER

## 2023-11-03 RX ORDER — METFORMIN HYDROCHLORIDE 500 MG/1
TABLET, EXTENDED RELEASE ORAL
Qty: 90 TABLET | Refills: 1 | Status: SHIPPED | OUTPATIENT
Start: 2023-11-03

## 2023-11-03 RX ORDER — TIZANIDINE 4 MG/1
4 TABLET ORAL EVERY 8 HOURS PRN
Qty: 90 TABLET | Refills: 1 | Status: SHIPPED | OUTPATIENT
Start: 2023-11-03

## 2023-11-03 RX ORDER — DESLORATADINE 5 MG/1
5 TABLET ORAL DAILY
Qty: 90 TABLET | Refills: 1 | Status: SHIPPED | OUTPATIENT
Start: 2023-11-03

## 2023-11-03 RX ORDER — TADALAFIL 5 MG/1
5 TABLET ORAL DAILY
Qty: 30 TABLET | Refills: 2 | Status: SHIPPED | OUTPATIENT
Start: 2023-11-03

## 2023-11-03 SDOH — ECONOMIC STABILITY: FOOD INSECURITY: WITHIN THE PAST 12 MONTHS, THE FOOD YOU BOUGHT JUST DIDN'T LAST AND YOU DIDN'T HAVE MONEY TO GET MORE.: NEVER TRUE

## 2023-11-03 SDOH — ECONOMIC STABILITY: INCOME INSECURITY: HOW HARD IS IT FOR YOU TO PAY FOR THE VERY BASICS LIKE FOOD, HOUSING, MEDICAL CARE, AND HEATING?: HARD

## 2023-11-03 SDOH — ECONOMIC STABILITY: HOUSING INSECURITY
IN THE LAST 12 MONTHS, WAS THERE A TIME WHEN YOU DID NOT HAVE A STEADY PLACE TO SLEEP OR SLEPT IN A SHELTER (INCLUDING NOW)?: YES

## 2023-11-03 SDOH — ECONOMIC STABILITY: FOOD INSECURITY: WITHIN THE PAST 12 MONTHS, YOU WORRIED THAT YOUR FOOD WOULD RUN OUT BEFORE YOU GOT MONEY TO BUY MORE.: NEVER TRUE

## 2023-11-03 ASSESSMENT — ANXIETY QUESTIONNAIRES
6. BECOMING EASILY ANNOYED OR IRRITABLE: 0
4. TROUBLE RELAXING: 0
5. BEING SO RESTLESS THAT IT IS HARD TO SIT STILL: 0
1. FEELING NERVOUS, ANXIOUS, OR ON EDGE: 0
7. FEELING AFRAID AS IF SOMETHING AWFUL MIGHT HAPPEN: 0
2. NOT BEING ABLE TO STOP OR CONTROL WORRYING: 0
IF YOU CHECKED OFF ANY PROBLEMS ON THIS QUESTIONNAIRE, HOW DIFFICULT HAVE THESE PROBLEMS MADE IT FOR YOU TO DO YOUR WORK, TAKE CARE OF THINGS AT HOME, OR GET ALONG WITH OTHER PEOPLE: NOT DIFFICULT AT ALL
3. WORRYING TOO MUCH ABOUT DIFFERENT THINGS: 0
GAD7 TOTAL SCORE: 0

## 2023-11-03 ASSESSMENT — ENCOUNTER SYMPTOMS
CHEST TIGHTNESS: 0
ABDOMINAL PAIN: 0

## 2023-11-03 ASSESSMENT — PATIENT HEALTH QUESTIONNAIRE - PHQ9
4. FEELING TIRED OR HAVING LITTLE ENERGY: 0
SUM OF ALL RESPONSES TO PHQ QUESTIONS 1-9: 0
10. IF YOU CHECKED OFF ANY PROBLEMS, HOW DIFFICULT HAVE THESE PROBLEMS MADE IT FOR YOU TO DO YOUR WORK, TAKE CARE OF THINGS AT HOME, OR GET ALONG WITH OTHER PEOPLE: 0
7. TROUBLE CONCENTRATING ON THINGS, SUCH AS READING THE NEWSPAPER OR WATCHING TELEVISION: 0
3. TROUBLE FALLING OR STAYING ASLEEP: 0
2. FEELING DOWN, DEPRESSED OR HOPELESS: 0
9. THOUGHTS THAT YOU WOULD BE BETTER OFF DEAD, OR OF HURTING YOURSELF: 0
1. LITTLE INTEREST OR PLEASURE IN DOING THINGS: 0
5. POOR APPETITE OR OVEREATING: 0
SUM OF ALL RESPONSES TO PHQ QUESTIONS 1-9: 0
SUM OF ALL RESPONSES TO PHQ9 QUESTIONS 1 & 2: 0
6. FEELING BAD ABOUT YOURSELF - OR THAT YOU ARE A FAILURE OR HAVE LET YOURSELF OR YOUR FAMILY DOWN: 0
8. MOVING OR SPEAKING SO SLOWLY THAT OTHER PEOPLE COULD HAVE NOTICED. OR THE OPPOSITE, BEING SO FIGETY OR RESTLESS THAT YOU HAVE BEEN MOVING AROUND A LOT MORE THAN USUAL: 0

## 2023-11-03 NOTE — PROGRESS NOTES
[facility]    History of Present Illness:   Haydee Crow is a 46 y.o. male with history of  HTN, GERD, ED, Obesity, Allergies  CC: Follow up  History provided by patient and Records    HPI:  Hypertension Follow up: The patient reports:  taking medications as instructed, no medication side effects noted, no TIA's, no chest pain on exertion, no dyspnea on exertion, no swelling of ankles, no orthostatic dizziness or lightheadedness, no orthopnea or paroxysmal nocturnal dyspnea. BP Readings from Last 3 Encounters:   11/03/23 (!) 138/93   04/26/23 129/84   10/26/22 126/86     Prediabetes:  Patient reports good energy levels and doing well overall.  - Polydipsia?: No  - Polyphagia?: No  - Polyuria?: No  - Exercise: None  - Diet: Avoids carbs  - Current related medications: Metformin  - Weight loss since last visit?: Yes    Wt Readings from Last 3 Encounters:   11/03/23 125.6 kg (276 lb 12.8 oz)   04/26/23 122.5 kg (270 lb)   09/07/22 120.7 kg (266 lb)      GERD: Overall stable    Hypertriglyceridemia Follow up:   Cardiovascular risks for him are: hypertension  hyperlipidemia.    Current Medications:  ezetimibe-simvastatin - 10-20 MG     Compliance: YES              Myalgias: NO              Fatigue: NO              Other side effects: NO     Wt Readings from Last 3 Encounters:   11/03/23 125.6 kg (276 lb 12.8 oz)   04/26/23 122.5 kg (270 lb)   09/07/22 120.7 kg (266 lb)       Lab Results   Component Value Date/Time    CHOL 119 04/26/2023 10:45 AM    HDL 40 04/26/2023 10:45 AM      Lab Results   Component Value Date/Time    ALT 41 04/26/2023 10:45 AM    AST 26 04/26/2023 10:45 AM         Health Maintenance  Health Maintenance Due   Topic Date Due    Shingles vaccine (1 of 2) Never done    COVID-19 Vaccine (3 - [employer] series) 06/16/2021    Depression Screen  06/07/2023       Past Medical, Family, and Social History:     Current Outpatient Medications on File Prior to Visit   Medication Sig